# Patient Record
Sex: FEMALE | Race: WHITE | NOT HISPANIC OR LATINO | Employment: OTHER | URBAN - METROPOLITAN AREA
[De-identification: names, ages, dates, MRNs, and addresses within clinical notes are randomized per-mention and may not be internally consistent; named-entity substitution may affect disease eponyms.]

---

## 2017-01-16 ENCOUNTER — PATIENT MESSAGE (OUTPATIENT)
Dept: ENDOCRINOLOGY | Facility: CLINIC | Age: 57
End: 2017-01-16

## 2017-06-20 DIAGNOSIS — D49.59 OVARIAN TUMOR: Primary | ICD-10-CM

## 2017-08-22 ENCOUNTER — PATIENT MESSAGE (OUTPATIENT)
Dept: ENDOCRINOLOGY | Facility: CLINIC | Age: 57
End: 2017-08-22

## 2017-08-22 DIAGNOSIS — M25.569 KNEE PAIN, UNSPECIFIED CHRONICITY, UNSPECIFIED LATERALITY: Primary | ICD-10-CM

## 2017-08-23 ENCOUNTER — PATIENT MESSAGE (OUTPATIENT)
Dept: ENDOCRINOLOGY | Facility: CLINIC | Age: 57
End: 2017-08-23

## 2017-08-23 ENCOUNTER — PATIENT MESSAGE (OUTPATIENT)
Dept: ORTHOPEDICS | Facility: CLINIC | Age: 57
End: 2017-08-23

## 2017-10-09 ENCOUNTER — HOSPITAL ENCOUNTER (OUTPATIENT)
Dept: RADIOLOGY | Facility: HOSPITAL | Age: 57
Discharge: HOME | End: 2017-10-09
Attending: ORTHOPAEDIC SURGERY
Payer: COMMERCIAL

## 2017-10-09 ENCOUNTER — HOSPITAL ENCOUNTER (OUTPATIENT)
Dept: CARDIOLOGY | Facility: CLINIC | Age: 57
Discharge: HOME | End: 2017-10-09
Attending: ORTHOPAEDIC SURGERY
Payer: COMMERCIAL

## 2017-10-09 ENCOUNTER — OFFICE VISIT (OUTPATIENT)
Dept: ORTHOPEDICS | Facility: CLINIC | Age: 57
End: 2017-10-09
Payer: COMMERCIAL

## 2017-10-09 ENCOUNTER — OFFICE VISIT (OUTPATIENT)
Dept: ENDOCRINOLOGY | Facility: CLINIC | Age: 57
End: 2017-10-09
Payer: COMMERCIAL

## 2017-10-09 VITALS — WEIGHT: 157.94 LBS | HEIGHT: 72 IN | BODY MASS INDEX: 21.39 KG/M2 | TEMPERATURE: 99 F

## 2017-10-09 VITALS
HEART RATE: 64 BPM | BODY MASS INDEX: 21.35 KG/M2 | WEIGHT: 157.63 LBS | DIASTOLIC BLOOD PRESSURE: 78 MMHG | SYSTOLIC BLOOD PRESSURE: 124 MMHG | HEIGHT: 72 IN

## 2017-10-09 DIAGNOSIS — D27.0: Primary | ICD-10-CM

## 2017-10-09 DIAGNOSIS — E11.9 TYPE 2 DIABETES MELLITUS WITHOUT COMPLICATION, WITHOUT LONG-TERM CURRENT USE OF INSULIN: ICD-10-CM

## 2017-10-09 DIAGNOSIS — M79.89 SOFT TISSUE MASS: Primary | ICD-10-CM

## 2017-10-09 DIAGNOSIS — M79.89 SOFT TISSUE MASS: ICD-10-CM

## 2017-10-09 DIAGNOSIS — E55.9 VITAMIN D DEFICIENCY DISEASE: ICD-10-CM

## 2017-10-09 PROCEDURE — 71020 XR CHEST PA AND LATERAL PRE-OP: CPT | Mod: TC

## 2017-10-09 PROCEDURE — 93000 ELECTROCARDIOGRAM COMPLETE: CPT | Mod: S$GLB,,, | Performed by: INTERNAL MEDICINE

## 2017-10-09 PROCEDURE — 99999 PR PBB SHADOW E&M-EST. PATIENT-LVL III: CPT | Mod: PBBFAC,,, | Performed by: INTERNAL MEDICINE

## 2017-10-09 PROCEDURE — 99213 OFFICE O/P EST LOW 20 MIN: CPT | Mod: S$GLB,,, | Performed by: INTERNAL MEDICINE

## 2017-10-09 PROCEDURE — 71020 XR CHEST PA AND LATERAL PRE-OP: CPT | Mod: 26,,, | Performed by: RADIOLOGY

## 2017-10-09 PROCEDURE — 99204 OFFICE O/P NEW MOD 45 MIN: CPT | Mod: 57,S$GLB,, | Performed by: ORTHOPAEDIC SURGERY

## 2017-10-09 PROCEDURE — 99999 PR PBB SHADOW E&M-EST. PATIENT-LVL III: CPT | Mod: PBBFAC,,, | Performed by: ORTHOPAEDIC SURGERY

## 2017-10-09 NOTE — PROGRESS NOTES
CHIEF COMPLAINT:  right thigh soft-tissue mass.                                                                                                                               HISTORY OF PRESENT ILLNESS:  The patient is a 57 y.o. female  who presents  for evaluation of her right thigh mass. She has had a right knee mass present for several years.  It has had slow growth.    Pain:negative    Night pain: Negative    Growth: Yes    History of skin warmth/erythema/changes: No    She  presents for further treatment recommendations.   She denies cough, sputum production, shortness of breath,   fever, chills, night sweats or weight loss.  She denies distal paresthesias.  She has no discreet history of prior trauma in the area.                                                                                                         PAST MEDICAL HISTORY:    Past Medical History:   Diagnosis Date    Anxiety     Cancer     leydig cell tumor    Elevated testosterone level in female     GE reflux     Hirsutism     HSV (herpes simplex virus) anogenital infection     Hyperlipidemia     PE (pulmonary embolism)     bilateral    PONV (postoperative nausea and vomiting)     Type II or unspecified type diabetes mellitus without mention of complication, not stated as uncontrolled    .                                               PAST SURGICAL HISTORY:    Past Surgical History:   Procedure Laterality Date    breast augmentatin      broken jaw      DILATION AND CURETTAGE OF UTERUS      HEMORRHOID SURGERY      HERNIA REPAIR      LYMPHADENECTOMY      OMENTECTOMY  9/14    TX REMOVAL OF OVARY/TUBE(S)      TONSILLECTOMY      TUBAL LIGATION           Current Outpatient Prescriptions:     alprazolam (XANAX) 0.25 MG tablet, Take 0.25 mg by mouth once daily. 1/2 tablet every am, Disp: , Rfl:     atenolol (TENORMIN) 50 MG tablet, Take 50 mg by mouth once daily., Disp: , Rfl:     atorvastatin (LIPITOR) 20 MG tablet, , Disp: ,  Rfl:     clotrimazole-betamethasone 1-0.05% (LOTRISONE) cream, , Disp: , Rfl:     digoxin (LANOXIN) 250 mcg tablet, Take 250 mcg by mouth once daily. , Disp: , Rfl:     escitalopram oxalate (LEXAPRO) 10 MG tablet, Take 5 mg by mouth once daily., Disp: , Rfl:     furosemide (LASIX) 40 MG tablet, , Disp: , Rfl:     glimepiride (AMARYL) 2 MG tablet, Take 2 mg by mouth daily with breakfast. , Disp: , Rfl:     metformin (GLUCOPHAGE) 1000 MG tablet, Take 1,000 mg by mouth 2 (two) times daily with meals., Disp: , Rfl:     omeprazole (PRILOSEC) 40 MG capsule, Take 40 mg by mouth every morning. , Disp: , Rfl:     potassium chloride (KLOR-CON) 10 MEQ TbSR, , Disp: , Rfl:     valacyclovir (VALTREX) 500 MG tablet, Take 500 mg by mouth once daily., Disp: , Rfl:     SOCIAL HISTORY:  Reviewed per EPIC history for tobacco or alcohol use and she  is an active  57 y.o.  female                                                                             FAMILY MEDICAL HISTORY:  family history includes Bipolar disorder in her brother and mother; Breast cancer in her cousin; Breast cancer (age of onset: 60) in her maternal aunt; Diabetes in her brother and father; Heart disease in her father; Heart failure in her mother; Hypertension in her sister; Kidney disease in her mother; Pancreatic cancer in her paternal grandmother.                                                                                                                               REVIEW OF SYSTEMS:  Full 10-system review of systems was performed today.    She denies joint arthralgias or back pain.   Denies chest pain, palpitations, shortness of breath.   Denies excessive thirst, urination or heat or cold intolerance.   Denies nausea, vomiting, melena or hematochezia.    Denies fever, chills, night sweats, weight loss.    Denies dysuria or hematuria.   Denies history of anxiety or depression.    Denies change in vision or hearing.    Denies any skin  abnormalities or rash.   Denies upper or lower extremity paresthesias or lightheadedness.    Denies cough, shortness of breath or hemoptysis.                                                                                                                                     PHYSICAL EXAMINATION:                                                        GENERAL:  A well-developed, well-nourished 57 y.o. female who is alert and       oriented in no acute distress.      Gait: She  walks with a normal gait.                   EXTREMITIES:  Examination of the extremities reveals there is a visible mass of the right knee lateral>medial.    The skin over both lower extremities is normal and unremarkable.  She has a   painless range of motion of the hips, knees and ankles            bilaterally.  Sensation is intact in both lower extremities.  There are no motor deficits in the lower  extremities bilaterally.  Pedal pulses are palpable distally bilaterally.    She has no calf tenderness to palpation nor edema.  She has a palpable mass ov the right distal thigh/knee.    HEENT: normocephalic and atraumatic, EOMI, sclera white.  Heart: RRR without appreciable mumrur  Lungs: CTA  Abdomen: soft, non-tender    The MRI was personally reviewed with the patient.  The lesion on all imaging sequences follows that of the subcutaneous fat with no significant areas of heterogeneity.  By MRI criteria this is highly suggestive of a benign lipoma.  There is no significant radiographic suggestion that this is liposarcoma.    Impression:  Lipoma    Plan:  Treatment options were discussed which include simple clinical observation vs. surgical excision.  I do not believe that open or needle biopsy is warranted.    The patient wishes to proceed with surgery at this time.    Risks and complications were discussed including but not limited to the risks of anesthetic complications, infection, wound healing complications, pain, stiffness, DVT, pulmonary  embolism, perioperative medical risks (cardiac, pulmonary, renal, neurologic), and death among others were discussed, including the risk of local recurrence.  The patient elects to proceed.  Will give lovenox postop with her history of PE.

## 2017-10-09 NOTE — H&P
CHIEF COMPLAINT:  right thigh soft-tissue mass.                                                                                                                               HISTORY OF PRESENT ILLNESS:  The patient is a 57 y.o. female  who presents  for evaluation of her right thigh mass. She has had a right knee mass present for several years.  It has had slow growth.    Pain:negative    Night pain: Negative    Growth: Yes    History of skin warmth/erythema/changes: No    She  presents for further treatment recommendations.   She denies cough, sputum production, shortness of breath,   fever, chills, night sweats or weight loss.  She denies distal paresthesias.  She has no discreet history of prior trauma in the area.                                                                                                         PAST MEDICAL HISTORY:    Past Medical History:   Diagnosis Date    Anxiety     Cancer     leydig cell tumor    Elevated testosterone level in female     GE reflux     Hirsutism     HSV (herpes simplex virus) anogenital infection     Hyperlipidemia     PE (pulmonary embolism)     bilateral    PONV (postoperative nausea and vomiting)     Type II or unspecified type diabetes mellitus without mention of complication, not stated as uncontrolled    .                                               PAST SURGICAL HISTORY:    Past Surgical History:   Procedure Laterality Date    breast augmentatin      broken jaw      DILATION AND CURETTAGE OF UTERUS      HEMORRHOID SURGERY      HERNIA REPAIR      LYMPHADENECTOMY      OMENTECTOMY  9/14    [place] REMOVAL OF OVARY/TUBE(S)      TONSILLECTOMY      TUBAL LIGATION           Current Outpatient Prescriptions:     alprazolam (XANAX) 0.25 MG tablet, Take 0.25 mg by mouth once daily. 1/2 tablet every am, Disp: , Rfl:     atenolol (TENORMIN) 50 MG tablet, Take 50 mg by mouth once daily., Disp: , Rfl:     atorvastatin (LIPITOR) 20 MG tablet, , Disp: ,  Rfl:     clotrimazole-betamethasone 1-0.05% (LOTRISONE) cream, , Disp: , Rfl:     digoxin (LANOXIN) 250 mcg tablet, Take 250 mcg by mouth once daily. , Disp: , Rfl:     escitalopram oxalate (LEXAPRO) 10 MG tablet, Take 5 mg by mouth once daily., Disp: , Rfl:     furosemide (LASIX) 40 MG tablet, , Disp: , Rfl:     glimepiride (AMARYL) 2 MG tablet, Take 2 mg by mouth daily with breakfast. , Disp: , Rfl:     metformin (GLUCOPHAGE) 1000 MG tablet, Take 1,000 mg by mouth 2 (two) times daily with meals., Disp: , Rfl:     omeprazole (PRILOSEC) 40 MG capsule, Take 40 mg by mouth every morning. , Disp: , Rfl:     potassium chloride (KLOR-CON) 10 MEQ TbSR, , Disp: , Rfl:     valacyclovir (VALTREX) 500 MG tablet, Take 500 mg by mouth once daily., Disp: , Rfl:     SOCIAL HISTORY:  Reviewed per EPIC history for tobacco or alcohol use and she  is an active  57 y.o.  female                                                                             FAMILY MEDICAL HISTORY:  family history includes Bipolar disorder in her brother and mother; Breast cancer in her cousin; Breast cancer (age of onset: 60) in her maternal aunt; Diabetes in her brother and father; Heart disease in her father; Heart failure in her mother; Hypertension in her sister; Kidney disease in her mother; Pancreatic cancer in her paternal grandmother.                                                                                                                               REVIEW OF SYSTEMS:  Full 10-system review of systems was performed today.    She denies joint arthralgias or back pain.   Denies chest pain, palpitations, shortness of breath.   Denies excessive thirst, urination or heat or cold intolerance.   Denies nausea, vomiting, melena or hematochezia.    Denies fever, chills, night sweats, weight loss.    Denies dysuria or hematuria.   Denies history of anxiety or depression.    Denies change in vision or hearing.    Denies any skin  abnormalities or rash.   Denies upper or lower extremity paresthesias or lightheadedness.    Denies cough, shortness of breath or hemoptysis.                                                                                                                                     PHYSICAL EXAMINATION:                                                        GENERAL:  A well-developed, well-nourished 57 y.o. female who is alert and       oriented in no acute distress.      Gait: She  walks with a normal gait.                   EXTREMITIES:  Examination of the extremities reveals there is a visible mass of the right knee lateral>medial.    The skin over both lower extremities is normal and unremarkable.  She has a   painless range of motion of the hips, knees and ankles            bilaterally.  Sensation is intact in both lower extremities.  There are no motor deficits in the lower  extremities bilaterally.  Pedal pulses are palpable distally bilaterally.    She has no calf tenderness to palpation nor edema.  She has a palpable mass ov the right distal thigh/knee.    HEENT: normocephalic and atraumatic, EOMI, sclera white.  Heart: RRR without appreciable mumrur  Lungs: CTA  Abdomen: soft, non-tender    The MRI was personally reviewed with the patient.  The lesion on all imaging sequences follows that of the subcutaneous fat with no significant areas of heterogeneity.  By MRI criteria this is highly suggestive of a benign lipoma.  There is no significant radiographic suggestion that this is liposarcoma.    Impression:  Lipoma    Plan:  Treatment options were discussed which include simple clinical observation vs. surgical excision.  I do not believe that open or needle biopsy is warranted.    The patient wishes to proceed with surgery at this time.    Risks and complications were discussed including but not limited to the risks of anesthetic complications, infection, wound healing complications, pain, stiffness, DVT, pulmonary  embolism, perioperative medical risks (cardiac, pulmonary, renal, neurologic), and death among others were discussed, including the risk of local recurrence.  The patient elects to proceed.  Will give lovenox postop with her history of PE.

## 2017-10-09 NOTE — PROGRESS NOTES
Subjective:      Patient ID: Carmen Burns is a 57 y.o. female.    Chief Complaint: ovarian leydig cell tumor     is presenting for follow up of ovarian tumor.    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 She had a post op bilateral pulmonary emboli and had to have a chest tube placed because of pleural effusion.   off off coumadin      Last seen by Dr. Smith in May 2016  does labs at [facility] - reports testosterone level was 18 from last week.    Continues to have hirsutism. Not any worse than last visit. Plucks hair above lip and below chin     No Hot flashes    + vaginal dryness   No libido       C/o fatigue  + snores      Weight stable    Does gardening. No formal exercise     + smoking        on metformin and SEGUNDO. Reports a1c of 7.3 from September 2017     Previously did clerical work but retired in 2016    Does not take vitamin d       Review of Systems   Constitutional: Negative for unexpected weight change.   Eyes: Negative for visual disturbance.   Respiratory: Negative for shortness of breath.    Cardiovascular: Negative for chest pain.   Gastrointestinal: Negative for abdominal pain.   Musculoskeletal: Positive for arthralgias.   Skin: Negative for wound.        +hirsutism   Neurological: Negative for headaches.   Hematological: Does not bruise/bleed easily.   Psychiatric/Behavioral: Negative for sleep disturbance.       Objective:     /78 (BP Location: Right arm, Patient Position: Sitting, BP Method: Large (Manual))   Pulse 64   Ht 6' (1.829 m)   Wt 71.5 kg (157 lb 10.1 oz)   BMI 21.38 kg/m²      Physical Exam   Constitutional: She appears well-developed and well-nourished.   HENT:   Head: Normocephalic and atraumatic.   Neck: Normal range of motion. No thyromegaly present.   Cardiovascular: Normal rate and regular rhythm.    Skin: Skin is warm and dry.   Vitals reviewed.      Assessment:     1. 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 Type 2 diabetes mellitus without complication, without long-term current use of insulin    3. Vitamin D deficiency disease        Plan:     1.  Leydig cell s/p RALH/BSO 9/8/14. Reports testosterone level of 18 last week. Will request records from [place]. Recommend yearly testosterone.... Sooner if any s/s androgen excess   2. Continue metformin and glimepiride. Dm type 2 managed by pcp. Would have pcp consider adding januvia as her last A1C from 9/2017 was at 7.3. Informed her that she does not need to regularly check her blood glucose.  3. Recommend vit d 2000 IU daily    Follow-up prn    Discussed with Dr. Luis Fitzgerald MD     I, Vandana Smith MD,  have personally taken the history and examined the patient and agree with the resident's note as stated above.

## 2017-10-11 ENCOUNTER — PATIENT MESSAGE (OUTPATIENT)
Dept: SURGERY | Facility: HOSPITAL | Age: 57
End: 2017-10-11

## 2017-10-11 ENCOUNTER — PATIENT MESSAGE (OUTPATIENT)
Dept: ENDOCRINOLOGY | Facility: CLINIC | Age: 57
End: 2017-10-11

## 2017-11-02 ENCOUNTER — PATIENT MESSAGE (OUTPATIENT)
Dept: SURGERY | Facility: HOSPITAL | Age: 57
End: 2017-11-02

## 2017-11-02 DIAGNOSIS — Z98.890 POSTOPERATIVE STATE: Primary | ICD-10-CM

## 2017-11-02 RX ORDER — ENOXAPARIN SODIUM 100 MG/ML
40 INJECTION SUBCUTANEOUS DAILY
Qty: 10 SYRINGE | Refills: 1 | Status: SHIPPED | OUTPATIENT
Start: 2017-11-02

## 2017-11-05 ENCOUNTER — PATIENT MESSAGE (OUTPATIENT)
Dept: ENDOCRINOLOGY | Facility: CLINIC | Age: 57
End: 2017-11-05

## 2017-11-08 ENCOUNTER — ANESTHESIA EVENT (OUTPATIENT)
Dept: SURGERY | Facility: HOSPITAL | Age: 57
End: 2017-11-08
Payer: COMMERCIAL

## 2017-11-09 NOTE — PRE-PROCEDURE INSTRUCTIONS
PreOp Instructions given:     - Verbal medication information (what to hold and what to take)   - NPO guidelines   - Arrival place directions given;  - Bathing with antibacterial soap   - Don't wear any jewelry or bring any valuables AM of surgery   - No makeup or moisturizer to face   - No perfume/cologne, powder, lotions or aftershave     Pt. verbalized understanding.     Personal history of PONV denies any family h/o complicatons with anesthesia or sedation.

## 2017-11-10 ENCOUNTER — ANESTHESIA (OUTPATIENT)
Dept: SURGERY | Facility: HOSPITAL | Age: 57
End: 2017-11-10
Payer: COMMERCIAL

## 2017-11-10 ENCOUNTER — SURGERY (OUTPATIENT)
Age: 57
End: 2017-11-10

## 2017-11-10 ENCOUNTER — HOSPITAL ENCOUNTER (OUTPATIENT)
Facility: HOSPITAL | Age: 57
Discharge: HOME | End: 2017-11-10
Attending: ORTHOPAEDIC SURGERY | Admitting: ORTHOPAEDIC SURGERY
Payer: COMMERCIAL

## 2017-11-10 VITALS
SYSTOLIC BLOOD PRESSURE: 97 MMHG | BODY MASS INDEX: 20.99 KG/M2 | RESPIRATION RATE: 16 BRPM | TEMPERATURE: 97 F | WEIGHT: 155 LBS | OXYGEN SATURATION: 99 % | DIASTOLIC BLOOD PRESSURE: 59 MMHG | HEIGHT: 72 IN | HEART RATE: 56 BPM

## 2017-11-10 LAB
POCT GLUCOSE: 106 MG/DL (ref 70–110)
POCT GLUCOSE: 153 MG/DL (ref 70–110)

## 2017-11-10 PROCEDURE — 71000015 HC POSTOP RECOV 1ST HR: Performed by: ORTHOPAEDIC SURGERY

## 2017-11-10 PROCEDURE — 25000003 PHARM REV CODE 250: Performed by: NURSE ANESTHETIST, CERTIFIED REGISTERED

## 2017-11-10 PROCEDURE — 63600175 PHARM REV CODE 636 W HCPCS: Performed by: STUDENT IN AN ORGANIZED HEALTH CARE EDUCATION/TRAINING PROGRAM

## 2017-11-10 PROCEDURE — 36000707: Performed by: ORTHOPAEDIC SURGERY

## 2017-11-10 PROCEDURE — 63600175 PHARM REV CODE 636 W HCPCS: Performed by: ANESTHESIOLOGY

## 2017-11-10 PROCEDURE — 82962 GLUCOSE BLOOD TEST: CPT | Performed by: ORTHOPAEDIC SURGERY

## 2017-11-10 PROCEDURE — 37000009 HC ANESTHESIA EA ADD 15 MINS: Performed by: ORTHOPAEDIC SURGERY

## 2017-11-10 PROCEDURE — 36000706: Performed by: ORTHOPAEDIC SURGERY

## 2017-11-10 PROCEDURE — 71000039 HC RECOVERY, EACH ADD'L HOUR: Performed by: ORTHOPAEDIC SURGERY

## 2017-11-10 PROCEDURE — 27000221 HC OXYGEN, UP TO 24 HOURS

## 2017-11-10 PROCEDURE — 64447 NJX AA&/STRD FEMORAL NRV IMG: CPT | Mod: 59,RT,, | Performed by: ANESTHESIOLOGY

## 2017-11-10 PROCEDURE — 63600175 PHARM REV CODE 636 W HCPCS: Performed by: NURSE ANESTHETIST, CERTIFIED REGISTERED

## 2017-11-10 PROCEDURE — D9220A PRA ANESTHESIA: Mod: CRNA,,, | Performed by: NURSE ANESTHETIST, CERTIFIED REGISTERED

## 2017-11-10 PROCEDURE — 25000003 PHARM REV CODE 250: Performed by: ANESTHESIOLOGY

## 2017-11-10 PROCEDURE — D9220A PRA ANESTHESIA: Mod: ANES,,, | Performed by: ANESTHESIOLOGY

## 2017-11-10 PROCEDURE — 25000003 PHARM REV CODE 250: Performed by: STUDENT IN AN ORGANIZED HEALTH CARE EDUCATION/TRAINING PROGRAM

## 2017-11-10 PROCEDURE — 88307 TISSUE EXAM BY PATHOLOGIST: CPT | Performed by: PATHOLOGY

## 2017-11-10 PROCEDURE — 27339 EXC THIGH/KNEE TUM DEP 5CM/>: CPT | Mod: RT,,, | Performed by: ORTHOPAEDIC SURGERY

## 2017-11-10 PROCEDURE — 71000033 HC RECOVERY, INTIAL HOUR: Performed by: ORTHOPAEDIC SURGERY

## 2017-11-10 PROCEDURE — 76942 ECHO GUIDE FOR BIOPSY: CPT | Performed by: ANESTHESIOLOGY

## 2017-11-10 PROCEDURE — 88307 TISSUE EXAM BY PATHOLOGIST: CPT | Mod: 26,,, | Performed by: PATHOLOGY

## 2017-11-10 PROCEDURE — 63600175 PHARM REV CODE 636 W HCPCS

## 2017-11-10 PROCEDURE — 71000016 HC POSTOP RECOV ADDL HR: Performed by: ORTHOPAEDIC SURGERY

## 2017-11-10 PROCEDURE — 37000008 HC ANESTHESIA 1ST 15 MINUTES: Performed by: ORTHOPAEDIC SURGERY

## 2017-11-10 RX ORDER — DEXAMETHASONE SODIUM PHOSPHATE 4 MG/ML
INJECTION, SOLUTION INTRA-ARTICULAR; INTRALESIONAL; INTRAMUSCULAR; INTRAVENOUS; SOFT TISSUE
Status: DISCONTINUED | OUTPATIENT
Start: 2017-11-10 | End: 2017-11-10

## 2017-11-10 RX ORDER — SODIUM CHLORIDE 9 MG/ML
INJECTION, SOLUTION INTRAVENOUS CONTINUOUS PRN
Status: DISCONTINUED | OUTPATIENT
Start: 2017-11-10 | End: 2017-11-10

## 2017-11-10 RX ORDER — OXYCODONE HYDROCHLORIDE 5 MG/1
10 TABLET ORAL EVERY 4 HOURS PRN
Status: DISCONTINUED | OUTPATIENT
Start: 2017-11-10 | End: 2017-11-10 | Stop reason: HOSPADM

## 2017-11-10 RX ORDER — FENTANYL CITRATE 50 UG/ML
INJECTION, SOLUTION INTRAMUSCULAR; INTRAVENOUS
Status: DISCONTINUED | OUTPATIENT
Start: 2017-11-10 | End: 2017-11-10

## 2017-11-10 RX ORDER — PROPOFOL 10 MG/ML
VIAL (ML) INTRAVENOUS
Status: DISCONTINUED | OUTPATIENT
Start: 2017-11-10 | End: 2017-11-10

## 2017-11-10 RX ORDER — FENTANYL CITRATE 50 UG/ML
100 INJECTION, SOLUTION INTRAMUSCULAR; INTRAVENOUS EVERY 5 MIN PRN
Status: DISCONTINUED | OUTPATIENT
Start: 2017-11-10 | End: 2017-11-10 | Stop reason: HOSPADM

## 2017-11-10 RX ORDER — ONDANSETRON 2 MG/ML
INJECTION INTRAMUSCULAR; INTRAVENOUS
Status: DISCONTINUED | OUTPATIENT
Start: 2017-11-10 | End: 2017-11-10

## 2017-11-10 RX ORDER — SCOLOPAMINE TRANSDERMAL SYSTEM 1 MG/1
1 PATCH, EXTENDED RELEASE TRANSDERMAL ONCE
Status: COMPLETED | OUTPATIENT
Start: 2017-11-10 | End: 2017-11-10

## 2017-11-10 RX ORDER — OXYCODONE AND ACETAMINOPHEN 10; 325 MG/1; MG/1
1 TABLET ORAL EVERY 4 HOURS PRN
Qty: 60 TABLET | Refills: 0 | Status: SHIPPED | OUTPATIENT
Start: 2017-11-10

## 2017-11-10 RX ORDER — ASPIRIN 81 MG
100 TABLET, DELAYED RELEASE (ENTERIC COATED) ORAL 2 TIMES DAILY
Qty: 56 TABLET | Refills: 0 | Status: SHIPPED | OUTPATIENT
Start: 2017-11-10 | End: 2017-12-08

## 2017-11-10 RX ORDER — MIDAZOLAM HYDROCHLORIDE 1 MG/ML
INJECTION, SOLUTION INTRAMUSCULAR; INTRAVENOUS
Status: DISCONTINUED | OUTPATIENT
Start: 2017-11-10 | End: 2017-11-10

## 2017-11-10 RX ORDER — MUPIROCIN 20 MG/G
OINTMENT TOPICAL
Status: DISCONTINUED | OUTPATIENT
Start: 2017-11-10 | End: 2017-11-10 | Stop reason: HOSPADM

## 2017-11-10 RX ORDER — SODIUM CHLORIDE 0.9 % (FLUSH) 0.9 %
3 SYRINGE (ML) INJECTION
Status: DISCONTINUED | OUTPATIENT
Start: 2017-11-10 | End: 2017-11-10 | Stop reason: HOSPADM

## 2017-11-10 RX ORDER — HYDROMORPHONE HYDROCHLORIDE 1 MG/ML
0.2 INJECTION, SOLUTION INTRAMUSCULAR; INTRAVENOUS; SUBCUTANEOUS EVERY 5 MIN PRN
Status: DISCONTINUED | OUTPATIENT
Start: 2017-11-10 | End: 2017-11-10 | Stop reason: HOSPADM

## 2017-11-10 RX ORDER — OXYCODONE HYDROCHLORIDE 5 MG/1
5 TABLET ORAL
Status: DISCONTINUED | OUTPATIENT
Start: 2017-11-10 | End: 2017-11-10 | Stop reason: HOSPADM

## 2017-11-10 RX ORDER — ONDANSETRON 4 MG/1
4 TABLET, ORALLY DISINTEGRATING ORAL EVERY 8 HOURS PRN
Qty: 30 TABLET | Refills: 0 | Status: SHIPPED | OUTPATIENT
Start: 2017-11-10

## 2017-11-10 RX ORDER — LIDOCAINE HCL/PF 100 MG/5ML
SYRINGE (ML) INTRAVENOUS
Status: DISCONTINUED | OUTPATIENT
Start: 2017-11-10 | End: 2017-11-10

## 2017-11-10 RX ORDER — MIDAZOLAM HYDROCHLORIDE 1 MG/ML
2 INJECTION INTRAMUSCULAR; INTRAVENOUS EVERY 5 MIN PRN
Status: DISCONTINUED | OUTPATIENT
Start: 2017-11-10 | End: 2017-11-10 | Stop reason: HOSPADM

## 2017-11-10 RX ORDER — HYDROMORPHONE HYDROCHLORIDE 1 MG/ML
INJECTION, SOLUTION INTRAMUSCULAR; INTRAVENOUS; SUBCUTANEOUS
Status: COMPLETED
Start: 2017-11-10 | End: 2017-11-10

## 2017-11-10 RX ORDER — FENTANYL CITRATE 50 UG/ML
25 INJECTION, SOLUTION INTRAMUSCULAR; INTRAVENOUS EVERY 5 MIN PRN
Status: COMPLETED | OUTPATIENT
Start: 2017-11-10 | End: 2017-11-10

## 2017-11-10 RX ADMIN — DEXAMETHASONE SODIUM PHOSPHATE 4 MG: 4 INJECTION, SOLUTION INTRAMUSCULAR; INTRAVENOUS at 01:11

## 2017-11-10 RX ADMIN — SCOPALAMINE 1 PATCH: 1 PATCH, EXTENDED RELEASE TRANSDERMAL at 10:11

## 2017-11-10 RX ADMIN — OXYCODONE HYDROCHLORIDE 5 MG: 5 TABLET ORAL at 02:11

## 2017-11-10 RX ADMIN — HYDROMORPHONE HYDROCHLORIDE 0.2 MG: 1 INJECTION, SOLUTION INTRAMUSCULAR; INTRAVENOUS; SUBCUTANEOUS at 04:11

## 2017-11-10 RX ADMIN — MIDAZOLAM HYDROCHLORIDE 2 MG: 1 INJECTION, SOLUTION INTRAMUSCULAR; INTRAVENOUS at 10:11

## 2017-11-10 RX ADMIN — FENTANYL CITRATE 25 MCG: 50 INJECTION, SOLUTION INTRAMUSCULAR; INTRAVENOUS at 01:11

## 2017-11-10 RX ADMIN — MUPIROCIN: 20 OINTMENT TOPICAL at 10:11

## 2017-11-10 RX ADMIN — FENTANYL CITRATE 50 MCG: 50 INJECTION, SOLUTION INTRAMUSCULAR; INTRAVENOUS at 01:11

## 2017-11-10 RX ADMIN — FENTANYL CITRATE 25 MCG: 50 INJECTION, SOLUTION INTRAMUSCULAR; INTRAVENOUS at 02:11

## 2017-11-10 RX ADMIN — PROPOFOL 50 MG: 10 INJECTION, EMULSION INTRAVENOUS at 01:11

## 2017-11-10 RX ADMIN — LIDOCAINE HYDROCHLORIDE 75 MG: 20 INJECTION, SOLUTION INTRAVENOUS at 12:11

## 2017-11-10 RX ADMIN — PROPOFOL 200 MG: 10 INJECTION, EMULSION INTRAVENOUS at 12:11

## 2017-11-10 RX ADMIN — SODIUM CHLORIDE, SODIUM GLUCONATE, SODIUM ACETATE, POTASSIUM CHLORIDE, MAGNESIUM CHLORIDE, SODIUM PHOSPHATE, DIBASIC, AND POTASSIUM PHOSPHATE: .53; .5; .37; .037; .03; .012; .00082 INJECTION, SOLUTION INTRAVENOUS at 01:11

## 2017-11-10 RX ADMIN — FENTANYL CITRATE 25 MCG: 50 INJECTION, SOLUTION INTRAMUSCULAR; INTRAVENOUS at 03:11

## 2017-11-10 RX ADMIN — SODIUM CHLORIDE: 0.9 INJECTION, SOLUTION INTRAVENOUS at 12:11

## 2017-11-10 RX ADMIN — MIDAZOLAM HYDROCHLORIDE 2 MG: 1 INJECTION, SOLUTION INTRAMUSCULAR; INTRAVENOUS at 12:11

## 2017-11-10 RX ADMIN — OXYCODONE HYDROCHLORIDE 5 MG: 5 TABLET ORAL at 06:11

## 2017-11-10 RX ADMIN — ONDANSETRON 4 MG: 2 INJECTION INTRAMUSCULAR; INTRAVENOUS at 01:11

## 2017-11-10 RX ADMIN — HYDROMORPHONE HYDROCHLORIDE 0.2 MG: 1 INJECTION, SOLUTION INTRAMUSCULAR; INTRAVENOUS; SUBCUTANEOUS at 03:11

## 2017-11-10 NOTE — ANESTHESIA PREPROCEDURE EVALUATION
11/10/2017  Carmen Burns is a 57 y.o., female.    Pre-operative evaluation for Procedure(s) (LRB):  [id number] (Right)    Carmen Burns is a 57 y.o. 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      Current Outpatient Prescriptions on File Prior to Encounter   Medication Sig Dispense Refill    alprazolam (XANAX) 0.25 MG tablet Take 0.25 mg by mouth once daily. 1/2 tablet every am      atenolol (TENORMIN) 50 MG tablet Take 50 mg by mouth once daily.      atorvastatin (LIPITOR) 20 MG tablet Take 20 mg by mouth once daily.       digoxin (LANOXIN) 250 mcg tablet Take 250 mcg by mouth once daily.       escitalopram oxalate (LEXAPRO) 10 MG tablet Take 5 mg by mouth once daily.      furosemide (LASIX) 40 MG tablet Take 40 mg by mouth once daily.       glimepiride (AMARYL) 2 MG tablet Take 2 mg by mouth daily with breakfast.       metformin (GLUCOPHAGE) 1000 MG tablet Take 1,000 mg by mouth 2 (two) times daily with meals.      omeprazole (PRILOSEC) 40 MG capsule Take 40 mg by mouth every morning.       potassium chloride (KLOR-CON) 10 MEQ TbSR Take 10 mEq by mouth once daily.       valacyclovir (VALTREX) 500 MG tablet Take 500 mg by mouth once daily.         Past Surgical History:   Procedure Laterality Date    breast augmentatin      broken jaw      DILATION AND CURETTAGE OF UTERUS      HEMORRHOID SURGERY      HERNIA REPAIR       LYMPHADENECTOMY      OMENTECTOMY  9/14    [place] REMOVAL OF OVARY/TUBE(S)      TONSILLECTOMY      TUBAL LIGATION         Social History     Social History    Marital status:      Spouse name: N/A    Number of children: N/A    Years of education: N/A     Occupational History    Not on file.     Social History Main Topics    Smoking status: Current Every Day Smoker     Packs/day: 1.00     Years: 38.00     Types: Cigarettes    Smokeless tobacco: Never Used    Alcohol use No    Drug use: No    Sexual activity: Yes     Partners: Male     Other Topics Concern    Not on file     Social History Narrative    No narrative on file         Vital Signs Range (Last 24H):         CBC: No results for input(s): WBC, RBC, HGB, HCT, PLT, MCV, MCH, MCHC in the last 72 hours.    CMP: No results for input(s): NA, K, CL, CO2, BUN, CREATININE, GLU, MG, PHOS, CALCIUM, ALBUMIN, PROT, ALKPHOS, ALT, AST, BILITOT in the last 72 hours.    INR  No results for input(s): INR, PROTIME, APTT in the last 72 hours.    Invalid input(s): PT        Diagnostic Studies:      EKG: 10/9/17  Sinus bradycardia  Otherwise normal ECG    2D Echo:        Anesthesia Evaluation    I have reviewed the Patient Summary Reports.    I have reviewed the Nursing Notes.   I have reviewed the Medications.     Review of Systems  Anesthesia Hx:  History of prior surgery of interest to airway management or planning: Previous anesthesia: General Denies Family Hx of Anesthesia complications.  Personal Hx of Anesthesia complications, Post-Operative Nausea/Vomiting, in the past, but not with recent anesthetics / prophylaxis   Hepatic/GI:   GERD    Endocrine:   Diabetes, type 2        Physical Exam  General:  Well nourished    Airway/Jaw/Neck:  Airway Findings: Mouth Opening: Normal Tongue: Normal  General Airway Assessment: Adult  Mallampati: II  Improves to II with phonation.  TM Distance: Normal, at least 6 cm      Dental:  Dental Findings: In tact     Chest/Lungs:  Chest/Lungs Clear    Heart/Vascular:  Heart Findings: Normal       Mental Status:  Mental Status Findings:  Cooperative, Alert and Oriented         Anesthesia Plan  Type of Anesthesia, risks & benefits discussed:  Anesthesia Type:  general, regional  Patient's Preference:   Intra-op Monitoring Plan: standard ASA monitors  Intra-op Monitoring Plan Comments:   Post Op Pain Control Plan: peripheral nerve block and IV/PO Opioids PRN  Post Op Pain Control Plan Comments:   Induction:   IV  Beta Blocker:  Patient is on a Beta-Blocker and has received one dose within the past 24 hours (No further documentation required).       Informed Consent: Patient understands risks and agrees with Anesthesia plan.  Questions answered. Anesthesia consent signed with patient.  ASA Score: 2     Day of Surgery Review of History & Physical: I have interviewed and examined the patient. I have reviewed the patient's H&P dated: 11/10/17.           Ready For Surgery From Anesthesia Perspective.

## 2017-11-10 NOTE — ANESTHESIA POSTPROCEDURE EVALUATION
Anesthesia Post Evaluation    Patient: Carmen Burns    Procedure(s) Performed: Procedure(s) (LRB):  [id number] (Right)      Patient location during evaluation: PACU  Patient participation: No - Unable to Participate, Sedation  Level of consciousness: awake and alert  Post-procedure vital signs: reviewed and stable  Pain management: adequate  Airway patency: patent  PONV status at discharge: No PONV  Anesthetic complications: no      Cardiovascular status: blood pressure returned to baseline  Respiratory status: unassisted and spontaneous ventilation  Hydration status: euvolemic  Follow-up not needed.        Visit Vitals  BP (!) 123/58   Pulse (!) 56   Temp 36.3 °C (97.3 °F) (Temporal)   Resp 14   Ht 6' (1.829 m)   Wt 70.3 kg (155 lb)   SpO2 95%   Breastfeeding? No   BMI 21.02 kg/m²       Pain/Karolina Score: Pain Assessment Performed: Yes (11/10/2017  1:47 PM)  Presence of Pain: non-verbal indicators absent (11/10/2017  1:47 PM)  Pain Rating Prior to Med Admin: 6 (11/10/2017  2:56 PM)  Karolina Score: 9 (11/10/2017 12:15 PM)

## 2017-11-10 NOTE — BRIEF OP NOTE
[facility]  Brief Operative Note     SUMMARY     Surgery Date: 11/10/2017     Surgeon(s) and Role:     * Bob Bills MD - Primary     * Jamal Aggarwal MD - Resident - Assisting        Pre-op Diagnosis:  Soft tissue mass [M79.9]    Post-op Diagnosis:  Post-Op Diagnosis Codes:     * Soft tissue mass [M79.9]    Procedure(s) (LRB):  [id number] (Right)    Anesthesia: Regional    Description of the findings of the procedure: lipoma removed from anterolateral thigh    Findings/Key Components: above    Estimated Blood Loss: * No values recorded between 11/10/2017  1:07 PM and 11/10/2017  1:46 PM *         Specimens:   Specimen (12h ago through future)    Start     Ordered    11/10/17 1315  Specimen to Pathology - Surgery  Once     Comments:  1) Right thigh mass - perm      11/10/17 1314          Discharge Note    SUMMARY     Admit Date: 11/10/2017    Discharge Date and Time:  11/10/2017 1:47 PM    Hospital Course (synopsis of major diagnoses, care, treatment, and services provided during the course of the hospital stay): Pt admitted for outpatient procedure, tolerated well.  Recovered in PACU and was discharged home on day of surgery.        Final Diagnosis: Post-Op Diagnosis Codes:     * Soft tissue mass [M79.9]    Disposition: Home or Self Care    Follow Up/Patient Instructions: .no f/u needed; will call with results    Medications:  Reconciled Home Medications:   Current Discharge Medication List      START taking these medications    Details   docusate sodium 100 mg capsule Take 100 mg by mouth 2 (two) times daily.  Qty: 56 tablet, Refills: 0      ondansetron (ZOFRAN-ODT) 4 MG TbDL Take 1 tablet (4 mg total) by mouth every 8 (eight) hours as needed.  Qty: 30 tablet, Refills: 0      oxyCODONE-acetaminophen (PERCOCET)  mg per tablet Take 1 tablet by mouth every 4 (four) hours as needed for Pain.  Qty: 60 tablet, Refills: 0         CONTINUE these medications which have NOT  CHANGED    Details   alprazolam (XANAX) 0.25 MG tablet Take 0.25 mg by mouth once daily. 1/2 tablet every am      atenolol (TENORMIN) 50 MG tablet Take 50 mg by mouth once daily.      atorvastatin (LIPITOR) 20 MG tablet Take 20 mg by mouth once daily.       digoxin (LANOXIN) 250 mcg tablet Take 250 mcg by mouth once daily.       escitalopram oxalate (LEXAPRO) 10 MG tablet Take 5 mg by mouth once daily.      furosemide (LASIX) 40 MG tablet Take 40 mg by mouth once daily.       glimepiride (AMARYL) 2 MG tablet Take 2 mg by mouth daily with breakfast.       metformin (GLUCOPHAGE) 1000 MG tablet Take 1,000 mg by mouth 2 (two) times daily with meals.      omeprazole (PRILOSEC) 40 MG capsule Take 40 mg by mouth every morning.       potassium chloride (KLOR-CON) 10 MEQ TbSR Take 10 mEq by mouth once daily.       valacyclovir (VALTREX) 500 MG tablet Take 500 mg by mouth once daily.      enoxaparin (LOVENOX) 40 mg/0.4 mL Syrg Inject 0.4 mLs (40 mg total) into the skin once daily.  Qty: 10 Syringe, Refills: 1    Associated Diagnoses: Postoperative state           No discharge procedures on file.

## 2017-11-10 NOTE — OP NOTE
DATE OF PROCEDURE:  11/10/2017    PREOPERATIVE DIAGNOSIS:  Deep intramuscular lipoma, right distal thigh.    POSTOPERATIVE DIAGNOSIS:  Deep intramuscular lipoma, right distal thigh.    PROCEDURE PERFORMED:  Excision of intramuscular lipoma, right distal thigh (CPT   #67538).    SURGEON:  Bob Bills M.D.    ASSISTANT:  Jamal Aggarwal M.D. (RES).    ANESTHESIA:  General.    ESTIMATED BLOOD LOSS:  Less than 100 mL.    INDICATIONS:  The patient is a 57-year-old female with a longstanding history of   a slowly enlarging mass of the right distal thigh and knee.  This was laterally   based.  Preoperative imaging revealed a large lipomatous mass without   suggestion of liposarcoma.  This was intraarticular and deep to the vastus   lateralis.  Treatment options were discussed and she elected to have this   excised.  Risks and complications were discussed including, but not limited to   the risks of anesthetic complications, infections, wound healing complications,   DVT, pulmonary embolism, death, local recurrence among others and she elected to   proceed.    DESCRIPTION OF PROCEDURE:  The patient was taken to the Operating Room where   general anesthesia was administered via the Anesthesia Department.  The right   lower extremity was sterilely prepped and draped in a normal fashion.    Under tourniquet control, a 12 cm longitudinal incision was made over the   anterolateral aspect of the right distal thigh.  Subcutaneous tissue was   dissected with electrocautery down to the deep fascia, which was incised.  The   vastus lateralis was then split along the line of its fibers.  The mass was then   easily identified deep to this.  It was carefully dissected using blunt   dissection using finger dissection and Metzenbaum scissors.  It dissected easily   from the surrounding subcutaneous tissue.  There were some fibrous adhesions to   the femur, which were easily removed.  The mass was then removed en bloc with   its  pseudocapsule intact and measured 12 x 10 cm.  The wound was thoroughly   irrigated.  The tourniquet was deflated and any significant bleeding was stopped   using electrocautery.  The deep fascia was then closed with interrupted   figure-of-8 sutures of #1 Vicryl.  The subcutaneous tissue was closed with   interrupted inverted stitches of #3-0 Vicryl.  The skin was approximated using a   running subcuticular stitch of #3-0 Monocryl and Dermabond.  A sterile dressing   was applied.  General anesthesia was reversed and she was returned to the   Postanesthesia Care Unit in stable condition.      MSM/IN  dd: 11/10/2017 13:31:22 (CST)  td: 11/10/2017 14:09:32 (CST)  Doc ID   #[id number]  Job ID #[id number]    CC:

## 2017-11-10 NOTE — INTERVAL H&P NOTE
The patient has been examined and the H&P has been reviewed:    I concur with the findings and no changes have occurred since H&P was written.    Anesthesia/Surgery risks, benefits and alternative options discussed and understood by patient/family.          Active Hospital Problems    Diagnosis  POA    Mass [R22.9]  Yes      Resolved Hospital Problems    Diagnosis Date Resolved POA   No resolved problems to display.

## 2017-11-10 NOTE — H&P
CHIEF COMPLAINT:  right thigh soft-tissue mass.                                                                                                                               HISTORY OF PRESENT ILLNESS:  The patient is a 57 y.o. female  who presents  for evaluation of her right thigh mass. She has had a right knee mass present for several years.  It has had slow growth.     Pain:negative     Night pain: Negative     Growth: Yes     History of skin warmth/erythema/changes: No     She  presents for further treatment recommendations.   She denies cough, sputum production, shortness of breath,   fever, chills, night sweats or weight loss.  She denies distal paresthesias.  She has no discreet history of prior trauma in the area.                                                                                                         PAST MEDICAL HISTORY:         Past Medical History:   Diagnosis Date    Anxiety      Cancer       leydig cell tumor    Elevated testosterone level in female      GE reflux      Hirsutism      HSV (herpes simplex virus) anogenital infection      Hyperlipidemia      PE (pulmonary embolism)       bilateral    PONV (postoperative nausea and vomiting)      Type II or unspecified type diabetes mellitus without mention of complication, not stated as uncontrolled     .                                                PAST SURGICAL HISTORY:          Past Surgical History:   Procedure Laterality Date    breast augmentatin        broken jaw        DILATION AND CURETTAGE OF UTERUS        HEMORRHOID SURGERY        HERNIA REPAIR        LYMPHADENECTOMY        OMENTECTOMY   9/14    IL REMOVAL OF OVARY/TUBE(S)        TONSILLECTOMY        TUBAL LIGATION                Current Outpatient Prescriptions:     alprazolam (XANAX) 0.25 MG tablet, Take 0.25 mg by mouth once daily. 1/2 tablet every am, Disp: , Rfl:     atenolol (TENORMIN) 50 MG tablet, Take 50 mg by mouth once daily., Disp: , Rfl:      atorvastatin (LIPITOR) 20 MG tablet, , Disp: , Rfl:     clotrimazole-betamethasone 1-0.05% (LOTRISONE) cream, , Disp: , Rfl:     digoxin (LANOXIN) 250 mcg tablet, Take 250 mcg by mouth once daily. , Disp: , Rfl:     escitalopram oxalate (LEXAPRO) 10 MG tablet, Take 5 mg by mouth once daily., Disp: , Rfl:     furosemide (LASIX) 40 MG tablet, , Disp: , Rfl:     glimepiride (AMARYL) 2 MG tablet, Take 2 mg by mouth daily with breakfast. , Disp: , Rfl:     metformin (GLUCOPHAGE) 1000 MG tablet, Take 1,000 mg by mouth 2 (two) times daily with meals., Disp: , Rfl:     omeprazole (PRILOSEC) 40 MG capsule, Take 40 mg by mouth every morning. , Disp: , Rfl:     potassium chloride (KLOR-CON) 10 MEQ TbSR, , Disp: , Rfl:     valacyclovir (VALTREX) 500 MG tablet, Take 500 mg by mouth once daily., Disp: , Rfl:      SOCIAL HISTORY:  Reviewed per EPIC history for tobacco or alcohol use and she  is an active  57 y.o.  female                                                                             FAMILY MEDICAL HISTORY:  family history includes Bipolar disorder in her brother and mother; Breast cancer in her cousin; Breast cancer (age of onset: 60) in her maternal aunt; Diabetes in her brother and father; Heart disease in her father; Heart failure in her mother; Hypertension in her sister; Kidney disease in her mother; Pancreatic cancer in her paternal grandmother.                                                                                                                               REVIEW OF SYSTEMS:  Full 10-system review of systems was performed today.    She denies joint arthralgias or back pain.   Denies chest pain, palpitations, shortness of breath.   Denies excessive thirst, urination or heat or cold intolerance.   Denies nausea, vomiting, melena or hematochezia.    Denies fever, chills, night sweats, weight loss.    Denies dysuria or hematuria.   Denies history of anxiety or depression.    Denies change in  vision or hearing.    Denies any skin abnormalities or rash.   Denies upper or lower extremity paresthesias or lightheadedness.    Denies cough, shortness of breath or hemoptysis.                                                                                                                                     PHYSICAL EXAMINATION:                                                        GENERAL:  A well-developed, well-nourished 57 y.o. female who is alert and       oriented in no acute distress.       Gait: She  walks with a normal gait.                   EXTREMITIES:  Examination of the extremities reveals there is a visible mass of the right knee lateral>medial.     The skin over both lower extremities is normal and unremarkable.  She has a   painless range of motion of the hips, knees and ankles            bilaterally.  Sensation is intact in both lower extremities.  There are no motor deficits in the lower  extremities bilaterally.  Pedal pulses are palpable distally bilaterally.    She has no calf tenderness to palpation nor edema.  She has a palpable mass ov the right distal thigh/knee.     HEENT: normocephalic and atraumatic, EOMI, sclera white.  Heart: RRR without appreciable mumrur  Lungs: CTA  Abdomen: soft, non-tender     The MRI was personally reviewed with the patient.  The lesion on all imaging sequences follows that of the subcutaneous fat with no significant areas of heterogeneity.  By MRI criteria this is highly suggestive of a benign lipoma.  There is no significant radiographic suggestion that this is liposarcoma.     Impression:  Lipoma     Plan:  Treatment options were discussed which include simple clinical observation vs. surgical excision.  I do not believe that open or needle biopsy is warranted.     The patient wishes to proceed with surgery at this time.    Risks and complications were discussed including but not limited to the risks of anesthetic complications, infection, wound healing  complications, pain, stiffness, DVT, pulmonary embolism, perioperative medical risks (cardiac, pulmonary, renal, neurologic), and death among others were discussed, including the risk of local recurrence.  The patient elects to proceed.  Will give lovenox postop with her history of PE.

## 2017-11-10 NOTE — TRANSFER OF CARE
Anesthesia Transfer of Care Note    Patient: Carmen Burns    Procedure(s) Performed: Procedure(s) (LRB):  [id number] (Right)    Patient location: PACU    Anesthesia Type: general    Transport from OR: Transported from OR on 6-10 L/min O2 by face mask with adequate spontaneous ventilation    Post pain: adequate analgesia    Post assessment: no apparent anesthetic complications and tolerated procedure well    Post vital signs: stable    Level of consciousness: awake, alert and oriented    Nausea/Vomiting: no nausea/vomiting    Complications: none    Transfer of care protocol was followed      Last vitals:   Visit Vitals  /61 (BP Location: Right arm, Patient Position: Lying)   Pulse 60   Temp 36.6 °C (97.9 °F) (Temporal)   Resp 20   Ht 6' (1.829 m)   Wt 70.3 kg (155 lb)   SpO2 100%   Breastfeeding? No   BMI 21.02 kg/m²

## 2017-11-10 NOTE — ANESTHESIA PROCEDURE NOTES
Adductor Canal Single Injection Block    Patient location during procedure: pre-op   Block not for primary anesthetic.  Reason for block: at surgeon's request and post-op pain management   Post-op Pain Location: right knee pain  Start time: 11/10/2017 10:55 AM  Timeout: 11/10/2017 10:55 AM   End time: 11/10/2017 11:05 AM  Staffing  Anesthesiologist: MICHAEL MARY  Resident/CRNA: SUELLEN GO  Performed: resident/CRNA   Preanesthetic Checklist  Completed: patient identified, site marked, surgical consent, pre-op evaluation, timeout performed, IV checked, risks and benefits discussed and monitors and equipment checked  Peripheral Block  Patient position: supine  Prep: ChloraPrep  Patient monitoring: heart rate, cardiac monitor, continuous pulse ox, continuous capnometry and frequent blood pressure checks  Block type: adductor canal  Laterality: right  Injection technique: single shot  Needle  Needle type: Stimuplex   Needle gauge: 21 G  Needle length: 4 in  Needle localization: anatomical landmarks and ultrasound guidance   -ultrasound image captured on disc.  Assessment  Injection assessment: negative aspiration, negative parasthesia and local visualized surrounding nerve  Paresthesia pain: none  Heart rate change: no  Slow fractionated injection: yes  Medications:  Bolus administered: 20 mL of 0.5 ropivacaine  Epinephrine added: 3.75 mcg/mL (1/300,000)  Additional Notes  VSS.  [facility] RN monitoring vitals throughout procedure.  Patient tolerated procedure well.

## 2017-11-11 NOTE — PLAN OF CARE
Problem: Patient Care Overview  Goal: Plan of Care Review  Outcome: Outcome(s) achieved Date Met: 11/10/17  Awake and alert. VSS. Denies  Nausea.Pain is tolerable. Tolerating liquids well.  DC instructions given to patient and family and they verbalize understanding. Knee immobilizer applied to RLE.

## 2018-04-18 NOTE — PLAN OF CARE
Pt sleepy but arousable. Vital signs stable. Denies pain and nausea. Awaiting transfer to phase II for discharge.   none

## 2018-10-10 ENCOUNTER — TELEPHONE (OUTPATIENT)
Dept: ENDOCRINOLOGY | Facility: CLINIC | Age: 58
End: 2018-10-10

## 2018-10-10 NOTE — TELEPHONE ENCOUNTER
----- Message from Margo Carlson sent at 10/10/2018 11:21 AM CDT -----  Contact: Carmen [phone]  PT wants to inform Dr. Smith that her Testerone has increased to 55. She is requesting a call at [phone].

## 2018-10-11 ENCOUNTER — PATIENT MESSAGE (OUTPATIENT)
Dept: ENDOCRINOLOGY | Facility: CLINIC | Age: 58
End: 2018-10-11

## 2018-10-11 DIAGNOSIS — D27.0: Primary | ICD-10-CM

## 2018-10-11 NOTE — TELEPHONE ENCOUNTER
Called patient and asked her to fax us the results.  She also wanted Dr Smith to know that she has had a recent 10lb unexplained weight loss.  Will inform her when I notify her of receiving the lab results.  She will be leaving to go out of town Tuesday Oct 16th.

## 2018-10-12 ENCOUNTER — PATIENT MESSAGE (OUTPATIENT)
Dept: ENDOCRINOLOGY | Facility: CLINIC | Age: 58
End: 2018-10-12

## 2018-10-15 ENCOUNTER — LAB VISIT (OUTPATIENT)
Dept: LAB | Facility: HOSPITAL | Age: 58
End: 2018-10-15
Attending: INTERNAL MEDICINE
Payer: COMMERCIAL

## 2018-10-15 DIAGNOSIS — D27.0: ICD-10-CM

## 2018-10-15 LAB
DHEA-S SERPL-MCNC: 137 UG/DL
ESTRADIOL SERPL-MCNC: 23 PG/ML
FSH SERPL-ACNC: 58.3 MIU/ML
LH SERPL-ACNC: 43.1 MIU/ML
TESTOST SERPL-MCNC: 27 NG/DL

## 2018-10-15 PROCEDURE — 83001 ASSAY OF GONADOTROPIN (FSH): CPT

## 2018-10-15 PROCEDURE — 82670 ASSAY OF TOTAL ESTRADIOL: CPT

## 2018-10-15 PROCEDURE — 82627 DEHYDROEPIANDROSTERONE: CPT

## 2018-10-15 PROCEDURE — 84270 ASSAY OF SEX HORMONE GLOBUL: CPT

## 2018-10-15 PROCEDURE — 84403 ASSAY OF TOTAL TESTOSTERONE: CPT

## 2018-10-15 PROCEDURE — 83002 ASSAY OF GONADOTROPIN (LH): CPT

## 2018-10-19 LAB
ALBUMIN SERPL-MCNC: 4.5 G/DL (ref 3.6–5.1)
SHBG SERPL-SCNC: 32 NMOL/L (ref 14–73)
TESTOST FREE SERPL-MCNC: 2.9 PG/ML (ref 0.2–5)
TESTOST SERPL-MCNC: 25 NG/DL (ref 2–45)
TESTOSTERONE.FREE+WB SERPL-MCNC: 6 NG/DL (ref 0.5–8.5)

## 2022-05-22 ENCOUNTER — HOSPITAL ENCOUNTER (INPATIENT)
Facility: HOSPITAL | Age: 62
LOS: 1 days | Discharge: HOME | DRG: 086 | End: 2022-05-23
Attending: EMERGENCY MEDICINE | Admitting: SURGERY
Payer: COMMERCIAL

## 2022-05-22 DIAGNOSIS — S02.19XA CLOSED FRACTURE OF FRONTAL SINUS, INITIAL ENCOUNTER: ICD-10-CM

## 2022-05-22 DIAGNOSIS — R41.0 CONFUSION: ICD-10-CM

## 2022-05-22 DIAGNOSIS — Z98.890 STATUS POST LUMBAR SPINE OPERATION: ICD-10-CM
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
## 2022-05-22 DIAGNOSIS — S09.90XA INJURY OF HEAD, INITIAL ENCOUNTER: ICD-10-CM
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
## 2022-05-22 DIAGNOSIS — M79.89 LEG SWELLING: ICD-10-CM

## 2022-05-22 DIAGNOSIS — I61.1: ICD-10-CM

## 2022-05-22 DIAGNOSIS — R07.9 CHEST PAIN: ICD-10-CM
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
## 2022-05-22 LAB
ALBUMIN SERPL-MCNC: 3.1 GM/DL (ref 3.5–5)
ALBUMIN/GLOB SERPL: 1.3 RATIO (ref 1.1–2)
ALP SERPL-CCNC: 78 UNIT/L (ref 40–150)
ALT SERPL-CCNC: 14 UNIT/L (ref 0–55)
APTT PPP: 30.4 SECONDS (ref 23.2–33.7)
AST SERPL-CCNC: 20 UNIT/L (ref 5–34)
BASOPHILS # BLD AUTO: 0.04 X10(3)/MCL (ref 0–0.2)
BASOPHILS NFR BLD AUTO: 0.3 %
BILIRUBIN DIRECT+TOT PNL SERPL-MCNC: 0.7 MG/DL
BUN SERPL-MCNC: 9.4 MG/DL (ref 9.8–20.1)
CALCIUM SERPL-MCNC: 8 MG/DL (ref 8.4–10.2)
CHLORIDE SERPL-SCNC: 98 MMOL/L (ref 98–107)
CO2 SERPL-SCNC: 32 MMOL/L (ref 22–29)
CREAT SERPL-MCNC: 0.8 MG/DL (ref 0.55–1.02)
EOSINOPHIL # BLD AUTO: 0.03 X10(3)/MCL (ref 0–0.9)
EOSINOPHIL NFR BLD AUTO: 0.3 %
ERYTHROCYTE [DISTWIDTH] IN BLOOD BY AUTOMATED COUNT: 12.9 % (ref 11.5–17)
ETHANOL SERPL-MCNC: <10 MG/DL
GLOBULIN SER-MCNC: 2.3 GM/DL (ref 2.4–3.5)
GLUCOSE SERPL-MCNC: 76 MG/DL (ref 74–100)
GROUP & RH: NORMAL
HCT VFR BLD AUTO: 34.6 % (ref 37–47)
HGB BLD-MCNC: 11.6 GM/DL (ref 12–16)
IMM GRANULOCYTES # BLD AUTO: 0.05 X10(3)/MCL (ref 0–0.02)
IMM GRANULOCYTES NFR BLD AUTO: 0.4 % (ref 0–0.43)
INDIRECT COOMBS GEL: NORMAL
INR BLD: 1.12 (ref 0–1.3)
LACTATE SERPL-SCNC: 1.1 MMOL/L (ref 0.5–2.2)
LACTATE SERPL-SCNC: 2 MMOL/L (ref 0.5–2.2)
LYMPHOCYTES # BLD AUTO: 2.55 X10(3)/MCL (ref 0.6–4.6)
LYMPHOCYTES NFR BLD AUTO: 21.6 %
MCH RBC QN AUTO: 33.4 PG (ref 27–31)
MCHC RBC AUTO-ENTMCNC: 33.5 MG/DL (ref 33–36)
MCV RBC AUTO: 99.7 FL (ref 80–94)
MONOCYTES # BLD AUTO: 0.93 X10(3)/MCL (ref 0.1–1.3)
MONOCYTES NFR BLD AUTO: 7.9 %
NEUTROPHILS # BLD AUTO: 8.2 X10(3)/MCL (ref 2.1–9.2)
NEUTROPHILS NFR BLD AUTO: 69.5 %
NRBC BLD AUTO-RTO: 0 %
PLATELET # BLD AUTO: 195 X10(3)/MCL (ref 130–400)
PMV BLD AUTO: 9.5 FL (ref 9.4–12.4)
POTASSIUM SERPL-SCNC: 3.1 MMOL/L (ref 3.5–5.1)
PROT SERPL-MCNC: 5.4 GM/DL (ref 6.4–8.3)
PROTHROMBIN TIME: 14.1 SECONDS (ref 12.5–14.5)
RBC # BLD AUTO: 3.47 X10(6)/MCL (ref 4.2–5.4)
SODIUM SERPL-SCNC: 138 MMOL/L (ref 136–145)
WBC # SPEC AUTO: 11.8 X10(3)/MCL (ref 4.5–11.5)

## 2022-05-22 PROCEDURE — 20000000 HC ICU ROOM

## 2022-05-22 PROCEDURE — 96361 HYDRATE IV INFUSION ADD-ON: CPT

## 2022-05-22 PROCEDURE — 63600175 PHARM REV CODE 636 W HCPCS: Performed by: EMERGENCY MEDICINE

## 2022-05-22 PROCEDURE — G0390 TRAUMA RESPONS W/HOSP CRITI: HCPCS

## 2022-05-22 PROCEDURE — 82077 ASSAY SPEC XCP UR&BREATH IA: CPT | Performed by: EMERGENCY MEDICINE

## 2022-05-22 PROCEDURE — 80053 COMPREHEN METABOLIC PANEL: CPT | Performed by: EMERGENCY MEDICINE

## 2022-05-22 PROCEDURE — 85610 PROTHROMBIN TIME: CPT | Performed by: EMERGENCY MEDICINE

## 2022-05-22 PROCEDURE — 63600175 PHARM REV CODE 636 W HCPCS: Performed by: STUDENT IN AN ORGANIZED HEALTH CARE EDUCATION/TRAINING PROGRAM

## 2022-05-22 PROCEDURE — 85730 THROMBOPLASTIN TIME PARTIAL: CPT | Performed by: EMERGENCY MEDICINE

## 2022-05-22 PROCEDURE — 93010 ELECTROCARDIOGRAM REPORT: CPT | Mod: ,,, | Performed by: INTERNAL MEDICINE

## 2022-05-22 PROCEDURE — 85025 COMPLETE CBC W/AUTO DIFF WBC: CPT | Performed by: EMERGENCY MEDICINE

## 2022-05-22 PROCEDURE — 86901 BLOOD TYPING SEROLOGIC RH(D): CPT | Performed by: EMERGENCY MEDICINE

## 2022-05-22 PROCEDURE — 93010 EKG 12-LEAD: ICD-10-PCS | Mod: ,,, | Performed by: INTERNAL MEDICINE

## 2022-05-22 PROCEDURE — 83605 ASSAY OF LACTIC ACID: CPT | Performed by: EMERGENCY MEDICINE

## 2022-05-22 PROCEDURE — 82962 GLUCOSE BLOOD TEST: CPT

## 2022-05-22 PROCEDURE — 93005 ELECTROCARDIOGRAM TRACING: CPT

## 2022-05-22 PROCEDURE — 96374 THER/PROPH/DIAG INJ IV PUSH: CPT

## 2022-05-22 PROCEDURE — 99291 CRITICAL CARE FIRST HOUR: CPT | Mod: 25

## 2022-05-22 PROCEDURE — 25000003 PHARM REV CODE 250: Performed by: STUDENT IN AN ORGANIZED HEALTH CARE EDUCATION/TRAINING PROGRAM

## 2022-05-22 PROCEDURE — 36415 COLL VENOUS BLD VENIPUNCTURE: CPT | Performed by: EMERGENCY MEDICINE

## 2022-05-22 RX ORDER — ESCITALOPRAM OXALATE 20 MG/1
20 TABLET ORAL DAILY
COMMUNITY

## 2022-05-22 RX ORDER — ATENOLOL 50 MG/1
50 TABLET ORAL DAILY
COMMUNITY

## 2022-05-22 RX ORDER — SODIUM CHLORIDE 0.9 % (FLUSH) 0.9 %
10 SYRINGE (ML) INJECTION
Status: DISCONTINUED | OUTPATIENT
Start: 2022-05-22 | End: 2022-05-23 | Stop reason: HOSPADM

## 2022-05-22 RX ORDER — DIGOXIN 250 MCG
250 TABLET ORAL DAILY
COMMUNITY

## 2022-05-22 RX ORDER — ATENOLOL 50 MG/1
50 TABLET ORAL DAILY
Status: DISCONTINUED | OUTPATIENT
Start: 2022-05-23 | End: 2022-05-23 | Stop reason: HOSPADM

## 2022-05-22 RX ORDER — DIGOXIN 250 MCG
250 TABLET ORAL DAILY
Status: DISCONTINUED | OUTPATIENT
Start: 2022-05-23 | End: 2022-05-23 | Stop reason: HOSPADM

## 2022-05-22 RX ORDER — ALPRAZOLAM 0.25 MG/1
0.25 TABLET ORAL 2 TIMES DAILY
COMMUNITY

## 2022-05-22 RX ORDER — ONDANSETRON 2 MG/ML
4 INJECTION INTRAMUSCULAR; INTRAVENOUS
Status: DISPENSED | OUTPATIENT
Start: 2022-05-22 | End: 2022-05-23

## 2022-05-22 RX ORDER — SODIUM CHLORIDE 9 MG/ML
INJECTION, SOLUTION INTRAVENOUS CONTINUOUS
Status: DISCONTINUED | OUTPATIENT
Start: 2022-05-22 | End: 2022-05-23 | Stop reason: HOSPADM

## 2022-05-22 RX ORDER — ALPRAZOLAM 0.25 MG/1
0.25 TABLET ORAL 2 TIMES DAILY
Status: DISCONTINUED | OUTPATIENT
Start: 2022-05-22 | End: 2022-05-23 | Stop reason: HOSPADM

## 2022-05-22 RX ORDER — ESCITALOPRAM OXALATE 10 MG/1
20 TABLET ORAL DAILY
Status: DISCONTINUED | OUTPATIENT
Start: 2022-05-23 | End: 2022-05-23 | Stop reason: HOSPADM

## 2022-05-22 RX ORDER — ONDANSETRON 2 MG/ML
INJECTION INTRAMUSCULAR; INTRAVENOUS
Status: DISPENSED
Start: 2022-05-22 | End: 2022-05-23

## 2022-05-22 RX ORDER — POTASSIUM CHLORIDE 750 MG/1
10 CAPSULE, EXTENDED RELEASE ORAL 2 TIMES DAILY
COMMUNITY

## 2022-05-22 RX ORDER — CLINDAMYCIN PHOSPHATE 600 MG/50ML
600 INJECTION, SOLUTION INTRAVENOUS
Status: DISCONTINUED | OUTPATIENT
Start: 2022-05-23 | End: 2022-05-23 | Stop reason: HOSPADM

## 2022-05-22 RX ORDER — GLIMEPIRIDE 2 MG/1
2 TABLET ORAL
COMMUNITY

## 2022-05-22 RX ORDER — ATORVASTATIN CALCIUM 20 MG/1
20 TABLET, FILM COATED ORAL DAILY
COMMUNITY

## 2022-05-22 RX ORDER — SODIUM CHLORIDE, SODIUM LACTATE, POTASSIUM CHLORIDE, CALCIUM CHLORIDE 600; 310; 30; 20 MG/100ML; MG/100ML; MG/100ML; MG/100ML
INJECTION, SOLUTION INTRAVENOUS
Status: COMPLETED | OUTPATIENT
Start: 2022-05-22 | End: 2022-05-22

## 2022-05-22 RX ORDER — METFORMIN HYDROCHLORIDE 500 MG/1
1000 TABLET ORAL DAILY
COMMUNITY

## 2022-05-22 RX ORDER — ONDANSETRON 2 MG/ML
INJECTION INTRAMUSCULAR; INTRAVENOUS CODE/TRAUMA/SEDATION MEDICATION
Status: COMPLETED | OUTPATIENT
Start: 2022-05-22 | End: 2022-05-22

## 2022-05-22 RX ORDER — GLUCAGON 1 MG
1 KIT INJECTION
Status: DISCONTINUED | OUTPATIENT
Start: 2022-05-22 | End: 2022-05-23 | Stop reason: HOSPADM

## 2022-05-22 RX ORDER — FUROSEMIDE 40 MG/1
40 TABLET ORAL DAILY
COMMUNITY

## 2022-05-22 RX ORDER — LEVETIRACETAM 500 MG/5ML
500 INJECTION, SOLUTION, CONCENTRATE INTRAVENOUS EVERY 12 HOURS
Status: DISCONTINUED | OUTPATIENT
Start: 2022-05-22 | End: 2022-05-23 | Stop reason: HOSPADM

## 2022-05-22 RX ORDER — OMEPRAZOLE 40 MG/1
40 CAPSULE, DELAYED RELEASE ORAL DAILY
COMMUNITY

## 2022-05-22 RX ORDER — ONDANSETRON 2 MG/ML
4 INJECTION INTRAMUSCULAR; INTRAVENOUS
Status: COMPLETED | OUTPATIENT
Start: 2022-05-22 | End: 2022-05-22

## 2022-05-22 RX ORDER — PANTOPRAZOLE SODIUM 40 MG/1
40 TABLET, DELAYED RELEASE ORAL DAILY
Status: DISCONTINUED | OUTPATIENT
Start: 2022-05-23 | End: 2022-05-23 | Stop reason: HOSPADM

## 2022-05-22 RX ORDER — VALACYCLOVIR HYDROCHLORIDE 500 MG/1
500 TABLET, FILM COATED ORAL 2 TIMES DAILY
COMMUNITY

## 2022-05-22 RX ORDER — INSULIN ASPART 100 [IU]/ML
1-10 INJECTION, SOLUTION INTRAVENOUS; SUBCUTANEOUS EVERY 6 HOURS PRN
Status: DISCONTINUED | OUTPATIENT
Start: 2022-05-22 | End: 2022-05-23 | Stop reason: HOSPADM

## 2022-05-22 RX ADMIN — SODIUM CHLORIDE: 900 INJECTION INTRAVENOUS at 11:05

## 2022-05-22 RX ADMIN — LEVETIRACETAM 500 MG: 100 INJECTION, SOLUTION INTRAVENOUS at 06:05

## 2022-05-22 RX ADMIN — ONDANSETRON 4 MG: 2 INJECTION INTRAMUSCULAR; INTRAVENOUS at 05:05

## 2022-05-22 RX ADMIN — SODIUM CHLORIDE, POTASSIUM CHLORIDE, SODIUM LACTATE AND CALCIUM CHLORIDE 1000 ML: 600; 310; 30; 20 INJECTION, SOLUTION INTRAVENOUS at 04:05

## 2022-05-22 RX ADMIN — ONDANSETRON 4 MG: 2 INJECTION INTRAMUSCULAR; INTRAVENOUS at 04:05

## 2022-05-22 NOTE — ED PROVIDER NOTES
"Encounter Date: 5/22/2022    SCRIBE #1 NOTE: I, Derick Chacon, am scribing for, and in the presence of,  Dr. Summers. I have scribed the following portions of the note - Other sections scribed: HPI, ROS, Physical Exam, MDM, Attending.       History     Chief Complaint   Patient presents with    Fall     Ground level fall at home, recent back surgery, ems reports initial gcs 14 and declined to 11 in route. Patient disoriented to time, place, and situation. Inappropriate answers. Trauma level 2.      60 yo CF with history of DM presents to ED following unwitnessed fall today after she was discharged from [facility] [place] surgery.  EMS reports that pt's  left her alone for about 15 minutes, then found her lying unresponsive on the floor when he returned.  They say that pt was initial GCS of 14 and that she complained of a HA.  Her GCS and O2 saturation declined while en route.  Pt is not on thinners and her CBG was 89.  She now complains of nausea and says she does not remember what happened.    Spoke with , Gerald. He reports patient had lumbar surgery to "have part of bone shaved off" 2 days ago and was discharged this afternoon. He says patient was a little "woozy" because of the meds she had been getting. When they arrived home, he put her on the couch and went to pharmacy to get her meds filled. He called her at the pharmacy and told her he would be 10-15 minutes and she was still inside at that time. When he drove up at the house, he was laying outside on the driveway.    The history is provided by the patient and the EMS personnel. The history is limited by the condition of the patient.   Fall  The accident occurred just prior to arrival. Distance fallen: ground level. The point of impact was the head. The pain is present in the head. Associated symptoms include nausea and headaches. Pertinent negatives include no fever, no numbness, no vomiting and no hematuria. Treatment on " scene includes a c-collar.     Review of patient's allergies indicates:  No Known Allergies  Past Medical History:   Diagnosis Date    Diabetes mellitus     Hypertension      Past Surgical History:   Procedure Laterality Date    LAMINECTOMY AND MICRODISCECTOMY LUMBAR SPINE       No family history on file.     Review of Systems   Constitutional: Negative for chills, diaphoresis and fever.   HENT: Negative for congestion and sore throat.    Eyes: Negative for visual disturbance.   Respiratory: Negative for cough and shortness of breath.    Cardiovascular: Negative for chest pain and palpitations.   Gastrointestinal: Positive for nausea. Negative for diarrhea and vomiting.   Genitourinary: Negative for dysuria and hematuria.   Skin: Negative for rash.   Neurological: Positive for headaches. Negative for syncope, weakness and numbness.   All other systems reviewed and are negative.      Physical Exam     Initial Vitals [05/22/22 1607]   BP Pulse Resp Temp SpO2   (!) 127/53 74 20 98.4 °F (36.9 °C) 96 %      MAP       --         Physical Exam    Nursing note and vitals reviewed.  Constitutional: She appears well-developed and well-nourished. She is not diaphoretic. She does not appear ill. No distress.   HENT:   Head: Normocephalic.   Right Ear: External ear normal.   Left Ear: External ear normal.   Mouth/Throat: Oropharynx is clear and moist.   Hematoma to L forehead; dry mucous membranes   Eyes: Conjunctivae and EOM are normal. Pupils are equal, round, and reactive to light.   Pupils 3-2 mm bilaterally     Neck: No tracheal deviation present.   In cervical collar   Cardiovascular: Normal rate, regular rhythm, normal heart sounds and intact distal pulses.   No murmur heard.  2+ bilateral radial and dorsalis pedis pulses   Pulmonary/Chest: Breath sounds normal. No respiratory distress. She has no wheezes. She has no rhonchi. She has no rales.   Abdominal: Abdomen is soft. Bowel sounds are normal. She exhibits no  distension. There is no abdominal tenderness.   No right CVA tenderness.  No left CVA tenderness.   Musculoskeletal:         General: Normal range of motion.     Neurological: She is alert. She has normal strength. No cranial nerve deficit or sensory deficit. GCS eye subscore is 3. GCS verbal subscore is 4. GCS motor subscore is 6.   Equal strength in upper and lower extremities; no facial droop; ; oriented to place and person but not time   Skin: Skin is warm and dry. Capillary refill takes less than 2 seconds. No rash noted. No pallor.   Abrasion to posterior aspect of L shoulder; abrasions to L arm; surgical incision to lumbar area (clean, dry, intact with appropriate tenderness)   Psychiatric: She has a normal mood and affect. Her behavior is normal.         ED Course   Critical Care    Date/Time: 5/22/2022 10:08 PM  Performed by: Minerva Summers MD  Authorized by: Minerva Summers MD   Direct patient critical care time: 15 minutes  Additional history critical care time: 10 minutes  Ordering / reviewing critical care time: 5 minutes  Documentation critical care time: 3 minutes  Consulting other physicians critical care time: 5 minutes  Consult with family critical care time: 3 minutes  Total critical care time (exclusive of procedural time) : 41 minutes  Critical care was necessary to treat or prevent imminent or life-threatening deterioration of the following conditions: cardiac failure, trauma, CNS failure or compromise and respiratory failure.  Critical care was time spent personally by me on the following activities: development of treatment plan with patient or surrogate, discussions with consultants, evaluation of patient's response to treatment, examination of patient, obtaining history from patient or surrogate, ordering and performing treatments and interventions, ordering and review of laboratory studies, ordering and review of radiographic studies, pulse oximetry, re-evaluation of patient's condition  and review of old charts.        Labs Reviewed   COMPREHENSIVE METABOLIC PANEL - Abnormal; Notable for the following components:       Result Value    Potassium Level 3.1 (*)     Carbon Dioxide 32 (*)     Blood Urea Nitrogen 9.4 (*)     Calcium Level Total 8.0 (*)     Protein Total 5.4 (*)     Albumin Level 3.1 (*)     Globulin 2.3 (*)     All other components within normal limits   CBC WITH DIFFERENTIAL - Abnormal; Notable for the following components:    WBC 11.8 (*)     RBC 3.47 (*)     Hgb 11.6 (*)     Hct 34.6 (*)     MCV 99.7 (*)     MCH 33.4 (*)     IG# 0.05 (*)     All other components within normal limits   PROTIME-INR - Normal   APTT - Normal   LACTIC ACID, PLASMA - Normal   ALCOHOL,MEDICAL (ETHANOL) - Normal   LACTIC ACID, PLASMA - Normal   CBC W/ AUTO DIFFERENTIAL    Narrative:     The following orders were created for panel order CBC auto differential.  Procedure                               Abnormality         Status                     ---------                               -----------         ------                     CBC with Differential[[id number]]        Abnormal            Final result                 Please view results for these tests on the individual orders.   URINALYSIS, REFLEX TO URINE CULTURE   DRUG SCREEN, URINE (BEAKER)   TYPE & SCREEN          Imaging Results          CT Maxillofacial Without Contrast (In process)                CT Chest Without Contrast (In process)                CT Thoracic Spine Without Contrast (Preliminary result)  Result time 05/22/22 17:08:13    Preliminary result by Kurt Nina MD (05/22/22 17:08:13)                 Narrative:    START OF REPORT:  Technique: CT of the thoracic spine without contrast with direct axial as well as sagittal and coronal reconstruction images.    Comparison: None.    Dosage Information: Automated Exposure Control was utilized.    Clinical History: Had back sx x 2 days ago; found down by  in  driveway.    Findings:  Mineralization of the bony structures: Within normal limits for age.  Bone marrow:  Curvature: Normal thoracic kyphosis.  Fractures: No fracture dislocation or subluxation is identified.  Degenerative changes: Subtle scattered thoracic spine spondylosis is seen.  Intervertebral disc spaces: Preserved throughout.  Osteophytes: Mild anterior osteophytes are seen.  Endplates: No significant endplate sclerosis.  Facet degenerative changes: Mild bilateral multilevel facet degenerative changes are seen.  Spinal canal: Unremarkable.    Miscellaneous: Tiny calcifications are noted in the left thyroid lobe. Correlate clinically and with other laboratory findings. Streaky and linear opacities are seen in the visualized lungs likely due to nonspecific dependent changes. There are also areas of dense subpleural opacity along the right lateral chest as well as the right lung apex which likely reflect areas of atelectasis or scarring with underlying nodular components not excluded.      Impression:  1. Tiny calcifications are noted in the left thyroid lobe. Correlate clinically and with other laboratory findings. Streaky and linear opacities are seen in the visualized lungs likely due to nonspecific dependent changes. There are also areas of dense subpleural opacity along the right lateral chest as well as the right lung apex which likely reflect areas of atelectasis or scarring with underlying nodular components not excluded. Correlate with clinical and laboratory findings as regards additional evaluation and follow up.  2. No fracture dislocation or subluxation is seen. Degenerative changes as above. Details and other findings as above.                                 CT Lumbar Spine Without Contrast (Preliminary result)  Result time 05/22/22 17:06:36    Preliminary result by Kurt Nina MD (05/22/22 17:06:36)                 Narrative:    START OF REPORT:  Technique: CT of the lumbar spine was  performed without intravenous contrast with direct axial as well as sagittal and coronal reconstruction images.    Comparison: None.    Clinical history: None.    Findings:  Mineralization: The bony mineralization is within normal limits for age.  Bone alignment: Unremarkable with no significant listhesis.  Bone and bone marrow: Vertebral body heights are maintained.  Intervertebral disc spaces: There is vacuum disc phenomenon at L4-L5 with the rest of the disc heights appearing preserved.  Osteophytes: Multilevel anterior marginal endplate osteophytes are noted.  Endplate Sclerosis: No significant endplate sclerosis is seen.  Spinal canal: Unremarkable with no bony spinal canal stenosis identified.  Findings at specific levels:  L4- 5: There has been right-sided hemilaminectomy with some gas in thehemi laminectomy surgical bed which likely is postoperative. There is a moderate disc bulge at the L4-L5 level with vacuum disc phenomenon at this level. This disc bulge causes narrowing of the right-sided neural foramina as well as likely displacing the bilateral transiting nerve roots at this level. No specific epidural hemorrhage is identified at this level.      Impression:  1. There has been right-sided hemilaminectomy with some gas in thehemi laminectomy surgical bed which likely is postoperative. There is a moderate disc bulge at the L4-L5 level with vacuum disc phenomenon at this level. This disc bulge causes narrowing of the right-sided neural foramina as well as likely displacing the bilateral transiting nerve roots at this level. No specific epidural hemorrhage is identified at this level.  2. Degenerative and postsurgical changes as above. No fracture or dislocation or subluxation is identified.                                 CT Cervical Spine Without Contrast (Preliminary result)  Result time 05/22/22 17:05:46    Preliminary result by Kurt Nina MD (05/22/22 17:05:46)                 Narrative:    START  OF REPORT:  Technique: CT of the cervical spine was performed without intravenous contrast with axial as well as sagittal and coronal images.    Comparison: None.    Dosage Information: Automated exposure control was utilized.    Clinical history: Had back sx x 2 days ago; found down by  in driveway.    Findings:  Lung apices: Lung findings discussed on thoracic spine CT.  Spine:  Spinal canal: The spinal canal appears unremarkable.  Mineralization: Within normal limits for age.  Rotation: No significant rotation is seen.  Scoliosis: No significant scoliosis is seen.  Vertebral Fusion: No vertebral fusion is identified.  Listhesis: No significant listhesis is identified.  Lordosis: The cervical lordosis is maintained.  Intervertebral disc spaces: The intervertebral discs are preserved throughout.  Osteophytes: Pronounced multilevel endplate osteophytes are seen.  Endplate Sclerosis: No significant endplate sclerosis is seen.  Uncovertebral degenerative changes: Mild multilevel uncovertebral joint arthrosis is seen.  Facet degenerative changes: Mild multilevel facet degenerative changes are seen.  Fractures: No acute fracture dislocation or subluxation is seen.      Impression:  1. No acute fracture dislocation or subluxation is seen.  2. Degenerative changes and other details as above.                                 CT Head Without Contrast (Final result)  Result time 05/22/22 17:01:07    Final result by Daryl Sewell MD (05/22/22 17:01:07)                 Impression:      1.  Fractures with depressions involving the outer table of the left frontal sinus with left frontal sinus hyperdense hemorrhagic fluid.    2.  Minimal focal fracture inner table of the left frontal sinus without pneumocephalus.    3.  Artifact versus punctate hyperdense hemorrhage right frontal lobe.    Findings were notified to Dr. Summers May 22, 2022 at 17:00 hours.      Electronically signed by: Daryl  Melodie  Date:    05/22/2022  Time:    17:01             Narrative:    EXAMINATION:  CT HEAD WITHOUT CONTRAST    CLINICAL HISTORY:  Trauma;    TECHNIQUE:  Sequential axial images were performed of the brain without contrast.    Dose product length of 2562 mGycm. Automated exposure control was utilized to minimize radiation dose.    COMPARISON:  None available.    FINDINGS:  There are fractures with depressions which involve the outer table of the left frontal sinus and the left frontal sinus is occupied by hyperdense fluid.  There is also minimal focal fracture defect of the inner table of the left neural sinus which is seen on image 23 series 7.  There is no apparent pneumocephalus.    There is an artifact versus punctate hyperdense hemorrhage right frontal lobe on image 23 series 5.  There is no intracranial mass effect, midline shift or hydrocephalus. There is no sulcal effacement or low attenuation changes to suggest recent large vessel territory infarction. Chronic appearing periventricular and subcortical white matter low attenuation changes are present and are consistent with chronic microangiopathic ischemia. The ventricular system and sulcal markings prominence is consistent with atrophy. There is no acute extra axial fluid collection.                               X-Ray Chest 1 View (Final result)  Result time 05/22/22 16:26:35    Final result by Daryl Sewell MD (05/22/22 16:26:35)                 Impression:      As above.      Electronically signed by: Daryl Sewell  Date:    05/22/2022  Time:    16:26             Narrative:    EXAMINATION:  XR CHEST 1 VIEW    CLINICAL HISTORY:  r/o bleeding or hemorrhage;    TECHNIQUE:  One view    COMPARISON:  None available.    FINDINGS:  Cardiopericardial silhouette is within normal limits.  Lungs hyperinflation accentuates bronchovascular appearance without exclusion of mild congestive process.  There is no focally dense consolidation, fluid within the pleural  space or pneumothorax.                              X-Rays:   Independently Interpreted Readings:   Chest X-Ray: Normal heart size.  No acute abnormalities.     Medications   lactated ringers bolus 1,000 mL (1,000 mLs Intravenous Not Given 5/22/22 1630)   ondansetron injection 4 mg (4 mg Intravenous Not Given 5/22/22 1630)   levETIRAcetam injection 500 mg (500 mg Intravenous Given 5/22/22 1813)   sodium chloride 0.9% flush 10 mL (has no administration in time range)   0.9%  NaCl infusion (has no administration in time range)   lactated ringers infusion ( Intravenous Stopped 5/22/22 1853)   ondansetron injection ( Intramuscular Not Given 5/22/22 1615)   ondansetron injection 4 mg (4 mg Intravenous Given 5/22/22 1733)     Medical Decision Making:   History:   I obtained history from: someone other than patient and EMS provider.       <> Summary of History: 62 yo female with AMS after fall discharged from surgical center today after lumbar surgery   Initial Assessment:   Level II Trauma   ABCs intact, alerted with GCS 13, vitals stable    Differential Diagnosis:   Intracranial hemorrhage, stroke, closed head injury, metabolic encephalopathy, spine injury  Independently Interpreted Test(s):   I have ordered and independently interpreted X-rays - see prior notes.  Clinical Tests:   Lab Tests: Ordered and Reviewed  Radiological Study: Ordered and Reviewed  ED Management:  CXR clear   CTH with possible punctate hemorrhage, frontal sinus fx  NSGY consulted  Given Keppra   Admitted to trauma service   Other:   I have discussed this case with another health care provider.       <> Summary of the Discussion: NSGY for consultation   Trauma surgery for admission           Scribe Attestation:   Scribe #1: I performed the above scribed service and the documentation accurately describes the services I performed. I attest to the accuracy of the note.    Attending Attestation:           Physician Attestation for Scribe:  Physician  Attestation Statement for Scribe #1: I, Dr. Summers, reviewed documentation, as scribed by Derick Chacon in my presence, and it is both accurate and complete.             ED Course as of 05/22/22 2211   Sun May 22, 2022   1701 Radiology called with possible punctate hemorrhage, will page NSGY to look at scans   [KM]   1723 [place] (NSGY)- recs ICU admission, repeat scan in AM, neuro checks and BP control  [KM]   1725 Paging surgical hospitalist for admission  [KM]   1737 Spoke with Dr Hermosillo (surgery resident) for admission  [KM]      ED Course User Index  [KM] Minerva Summers MD             Clinical Impression:   Final diagnoses:  [I61.1] Punctate hemorrhage of frontal lobe (Primary)  [S02.19XA] Closed fracture of frontal sinus, initial encounter  [Z98.890] Status post lumbar spine operation  [R41.0] Confusion  [S09.90XA] Injury of head, initial encounter  [W19.XXXA] Fall, initial encounter          ED Disposition Condition    Admit               Minerva Summers MD  05/22/22 2211

## 2022-05-22 NOTE — ED TRIAGE NOTES
"Patient identifiers verified and correct for Mrs. Morales. Pt arrives to ED as trauma level 2 for a ground level fall. Pt was found outside on driveway after being d/c'd from surgical hospital this afternoon. Hematoma to left forehead. Pt was GCS 14 upon finding but decreased to 11 with EMS. Assumed care of pt at this time.  LOC: The patient is awake, alert, oriented to self only. Pt opens eyes to voice. Speaks clear. Follows commands. iAPPEARANCE: Patient appears comfortable and in no acute distress,   SKIN: The skin is warm and dry, color consistent with ethnicity, patient has normal skin turgor. Left forehead hematoma and left posterior shoulder abrasions.  MUSCULOSKELETAL: Patient moving all extremities spontaneously, no swelling noted.  RESPIRATORY: Airway is open and patent, respirations are spontaneous, patient has a normal effort and rate, no accessory muscle use noted, pt placed on continuous pulse ox with O2 sats noted at 97% on 3 L NC.  CARDIAC: Pt placed on cardiac monitor. Patient has a normal rate and regular rhythm, no edema noted, capillary refill < 3 seconds.   GASTRO: Soft and non tender to palpation, no distention noted, normoactive bowel sounds present in all four quadrants. Pt states bowel movements have been regular.  : Pt denies any pain or frequency with urination.  NEURO: Pt opens eyes to voice, pt repeating statements like "I need to urinate". Oriented to self only. Follows commands.     "

## 2022-05-22 NOTE — ED NOTES
----- Message from Concha Fuller sent at 2/8/2018 10:02 AM CST -----  Contact: pt  States her appt was rescheduled and she would like to speak to the nurse. Please call pt at [phone]. Thank you    Bed: 15  Expected date:   Expected time:   Means of arrival:   Comments:  Trauma patient

## 2022-05-23 VITALS
BODY MASS INDEX: 32.14 KG/M2 | OXYGEN SATURATION: 83 % | HEART RATE: 62 BPM | SYSTOLIC BLOOD PRESSURE: 99 MMHG | WEIGHT: 200 LBS | RESPIRATION RATE: 25 BRPM | DIASTOLIC BLOOD PRESSURE: 50 MMHG | TEMPERATURE: 98 F | HEIGHT: 66 IN

## 2022-05-23 LAB
ALBUMIN SERPL-MCNC: 2.8 GM/DL (ref 3.4–4.8)
ALBUMIN/GLOB SERPL: 1 RATIO (ref 1.1–2)
ALP SERPL-CCNC: 74 UNIT/L (ref 40–150)
ALT SERPL-CCNC: 12 UNIT/L (ref 0–55)
AMPHET UR QL SCN: NEGATIVE
APPEARANCE UR: CLEAR
AST SERPL-CCNC: 18 UNIT/L (ref 5–34)
BACTERIA #/AREA URNS AUTO: NORMAL /HPF
BARBITURATE SCN PRESENT UR: NEGATIVE
BASOPHILS # BLD AUTO: 0.03 X10(3)/MCL (ref 0–0.2)
BASOPHILS NFR BLD AUTO: 0.3 %
BENZODIAZ UR QL SCN: POSITIVE
BILIRUB UR QL STRIP.AUTO: NEGATIVE MG/DL
BILIRUBIN DIRECT+TOT PNL SERPL-MCNC: 0.9 MG/DL
BUN SERPL-MCNC: 7.1 MG/DL (ref 9.8–20.1)
CALCIUM SERPL-MCNC: 8 MG/DL (ref 8.4–10.2)
CANNABINOIDS UR QL SCN: NEGATIVE
CHLORIDE SERPL-SCNC: 100 MMOL/L (ref 98–107)
CO2 SERPL-SCNC: 29 MMOL/L (ref 23–31)
COCAINE UR QL SCN: NEGATIVE
COLOR UR AUTO: YELLOW
CREAT SERPL-MCNC: 0.7 MG/DL (ref 0.55–1.02)
EOSINOPHIL # BLD AUTO: 0.05 X10(3)/MCL (ref 0–0.9)
EOSINOPHIL NFR BLD AUTO: 0.5 %
ERYTHROCYTE [DISTWIDTH] IN BLOOD BY AUTOMATED COUNT: 12.7 % (ref 11.5–17)
FENTANYL UR QL SCN: NEGATIVE
GLOBULIN SER-MCNC: 2.8 GM/DL (ref 2.4–3.5)
GLUCOSE SERPL-MCNC: 96 MG/DL (ref 82–115)
GLUCOSE UR QL STRIP.AUTO: NEGATIVE MG/DL
HCT VFR BLD AUTO: 33 % (ref 37–47)
HGB BLD-MCNC: 10.9 GM/DL (ref 12–16)
IMM GRANULOCYTES # BLD AUTO: 0.04 X10(3)/MCL (ref 0–0.02)
IMM GRANULOCYTES NFR BLD AUTO: 0.4 % (ref 0–0.43)
KETONES UR QL STRIP.AUTO: NEGATIVE MG/DL
LEUKOCYTE ESTERASE UR QL STRIP.AUTO: NEGATIVE UNIT/L
LYMPHOCYTES # BLD AUTO: 2.31 X10(3)/MCL (ref 0.6–4.6)
LYMPHOCYTES NFR BLD AUTO: 22.8 %
MAGNESIUM SERPL-MCNC: 1.2 MG/DL (ref 1.6–2.6)
MCH RBC QN AUTO: 33.3 PG (ref 27–31)
MCHC RBC AUTO-ENTMCNC: 33 MG/DL (ref 33–36)
MCV RBC AUTO: 100.9 FL (ref 80–94)
MDMA UR QL SCN: NEGATIVE
MONOCYTES # BLD AUTO: 0.81 X10(3)/MCL (ref 0.1–1.3)
MONOCYTES NFR BLD AUTO: 8 %
NEUTROPHILS # BLD AUTO: 6.9 X10(3)/MCL (ref 2.1–9.2)
NEUTROPHILS NFR BLD AUTO: 68 %
NITRITE UR QL STRIP.AUTO: NEGATIVE
NRBC BLD AUTO-RTO: 0 %
OPIATES UR QL SCN: POSITIVE
PCP UR QL: NEGATIVE
PH UR STRIP.AUTO: 8 [PH]
PH UR: 8 [PH] (ref 3–11)
PHOSPHATE SERPL-MCNC: 4.4 MG/DL (ref 2.3–4.7)
PLATELET # BLD AUTO: 184 X10(3)/MCL (ref 130–400)
PMV BLD AUTO: 9.6 FL (ref 9.4–12.4)
POCT GLUCOSE: 152 MG/DL (ref 70–110)
POCT GLUCOSE: 72 MG/DL (ref 70–110)
POCT GLUCOSE: 79 MG/DL (ref 70–110)
POCT GLUCOSE: 80 MG/DL (ref 70–110)
POTASSIUM SERPL-SCNC: 3.4 MMOL/L (ref 3.5–5.1)
PROT SERPL-MCNC: 5.6 GM/DL (ref 5.8–7.6)
PROT UR QL STRIP.AUTO: NEGATIVE MG/DL
RBC # BLD AUTO: 3.27 X10(6)/MCL (ref 4.2–5.4)
RBC #/AREA URNS AUTO: <5 /HPF
RBC UR QL AUTO: NEGATIVE UNIT/L
SODIUM SERPL-SCNC: 140 MMOL/L (ref 136–145)
SP GR UR STRIP.AUTO: 1.01 (ref 1–1.03)
SPECIFIC GRAVITY, URINE AUTO (.000) (OHS): 1.01 (ref 1–1.03)
SQUAMOUS #/AREA URNS AUTO: <4 /LPF
UROBILINOGEN UR STRIP-ACNC: 2 MG/DL
WBC # SPEC AUTO: 10.1 X10(3)/MCL (ref 4.5–11.5)
WBC #/AREA URNS AUTO: <5 /HPF

## 2022-05-23 PROCEDURE — 63600175 PHARM REV CODE 636 W HCPCS: Performed by: STUDENT IN AN ORGANIZED HEALTH CARE EDUCATION/TRAINING PROGRAM

## 2022-05-23 PROCEDURE — 25000003 PHARM REV CODE 250: Performed by: STUDENT IN AN ORGANIZED HEALTH CARE EDUCATION/TRAINING PROGRAM

## 2022-05-23 PROCEDURE — 83735 ASSAY OF MAGNESIUM: CPT | Performed by: STUDENT IN AN ORGANIZED HEALTH CARE EDUCATION/TRAINING PROGRAM

## 2022-05-23 PROCEDURE — 27000221 HC OXYGEN, UP TO 24 HOURS

## 2022-05-23 PROCEDURE — 84100 ASSAY OF PHOSPHORUS: CPT | Performed by: STUDENT IN AN ORGANIZED HEALTH CARE EDUCATION/TRAINING PROGRAM

## 2022-05-23 PROCEDURE — 80307 DRUG TEST PRSMV CHEM ANLYZR: CPT | Performed by: STUDENT IN AN ORGANIZED HEALTH CARE EDUCATION/TRAINING PROGRAM

## 2022-05-23 PROCEDURE — 81001 URINALYSIS AUTO W/SCOPE: CPT | Performed by: STUDENT IN AN ORGANIZED HEALTH CARE EDUCATION/TRAINING PROGRAM

## 2022-05-23 PROCEDURE — 94799 UNLISTED PULMONARY SVC/PX: CPT

## 2022-05-23 PROCEDURE — 80053 COMPREHEN METABOLIC PANEL: CPT | Performed by: STUDENT IN AN ORGANIZED HEALTH CARE EDUCATION/TRAINING PROGRAM

## 2022-05-23 PROCEDURE — 36415 COLL VENOUS BLD VENIPUNCTURE: CPT | Performed by: STUDENT IN AN ORGANIZED HEALTH CARE EDUCATION/TRAINING PROGRAM

## 2022-05-23 PROCEDURE — 85025 COMPLETE CBC W/AUTO DIFF WBC: CPT | Performed by: STUDENT IN AN ORGANIZED HEALTH CARE EDUCATION/TRAINING PROGRAM

## 2022-05-23 PROCEDURE — 94761 N-INVAS EAR/PLS OXIMETRY MLT: CPT

## 2022-05-23 RX ORDER — MORPHINE SULFATE 4 MG/ML
2 INJECTION, SOLUTION INTRAMUSCULAR; INTRAVENOUS
Status: DISCONTINUED | OUTPATIENT
Start: 2022-05-23 | End: 2022-05-23 | Stop reason: HOSPADM

## 2022-05-23 RX ORDER — MORPHINE SULFATE 4 MG/ML
2 INJECTION, SOLUTION INTRAMUSCULAR; INTRAVENOUS ONCE
Status: COMPLETED | OUTPATIENT
Start: 2022-05-23 | End: 2022-05-23

## 2022-05-23 RX ORDER — DEXTROMETHORPHAN HYDROBROMIDE, GUAIFENESIN 5; 100 MG/5ML; MG/5ML
650 LIQUID ORAL EVERY 8 HOURS
Qty: 15 TABLET | Refills: 0 | Status: SHIPPED | OUTPATIENT
Start: 2022-05-23 | End: 2022-05-28

## 2022-05-23 RX ORDER — HYDRALAZINE HYDROCHLORIDE 20 MG/ML
10 INJECTION INTRAMUSCULAR; INTRAVENOUS EVERY 6 HOURS PRN
Status: DISCONTINUED | OUTPATIENT
Start: 2022-05-23 | End: 2022-05-23 | Stop reason: HOSPADM

## 2022-05-23 RX ORDER — ENOXAPARIN SODIUM 100 MG/ML
30 INJECTION SUBCUTANEOUS EVERY 12 HOURS
Status: DISCONTINUED | OUTPATIENT
Start: 2022-05-23 | End: 2022-05-23 | Stop reason: HOSPADM

## 2022-05-23 RX ORDER — OXYCODONE HYDROCHLORIDE 5 MG/1
10 TABLET ORAL EVERY 6 HOURS PRN
Status: DISCONTINUED | OUTPATIENT
Start: 2022-05-23 | End: 2022-05-23 | Stop reason: HOSPADM

## 2022-05-23 RX ADMIN — ENOXAPARIN SODIUM 30 MG: 40 INJECTION SUBCUTANEOUS at 10:05

## 2022-05-23 RX ADMIN — MORPHINE SULFATE 2 MG: 4 INJECTION INTRAVENOUS at 01:05

## 2022-05-23 RX ADMIN — CLINDAMYCIN PHOSPHATE 600 MG: 600 INJECTION, SOLUTION INTRAVENOUS at 10:05

## 2022-05-23 RX ADMIN — PANTOPRAZOLE SODIUM 40 MG: 40 TABLET, DELAYED RELEASE ORAL at 08:05

## 2022-05-23 RX ADMIN — ESCITALOPRAM OXALATE 20 MG: 10 TABLET ORAL at 08:05

## 2022-05-23 RX ADMIN — CLINDAMYCIN PHOSPHATE 600 MG: 600 INJECTION, SOLUTION INTRAVENOUS at 01:05

## 2022-05-23 RX ADMIN — LEVETIRACETAM 500 MG: 100 INJECTION, SOLUTION INTRAVENOUS at 08:05

## 2022-05-23 RX ADMIN — ATENOLOL 50 MG: 50 TABLET ORAL at 08:05

## 2022-05-23 RX ADMIN — ALPRAZOLAM 0.25 MG: 0.25 TABLET ORAL at 08:05

## 2022-05-23 RX ADMIN — DIGOXIN 250 MCG: 250 TABLET ORAL at 08:05

## 2022-05-23 RX ADMIN — OXYCODONE HYDROCHLORIDE 10 MG: 5 TABLET ORAL at 03:05

## 2022-05-23 RX ADMIN — SODIUM CHLORIDE: 900 INJECTION INTRAVENOUS at 08:05

## 2022-05-23 RX ADMIN — MORPHINE SULFATE 2 MG: 4 INJECTION INTRAVENOUS at 06:05

## 2022-05-23 NOTE — PT/OT/SLP PROGRESS
Occupational Therapy      Patient Name:  JAG NORIEGA   MRN:  [id number]    OT eval orders received. Pt declined stating she did not sleep well last night and would like to rest. Will follow up as 5/24.    5/23/2022

## 2022-05-23 NOTE — DISCHARGE SUMMARY
[facility] - 7th Floor ICU  General Surgery  Discharge Summary      Patient Name: JAG NORIEGA  MRN: [id number]  Admission Date: 5/22/2022  Hospital Length of Stay: 1 days  Discharge Date and Time:  05/23/2022 4:06 PM  Attending Physician: Isauro Nice MD   Discharging Provider: Clifford Levine MD  Primary Care Provider: Obi Bernstein MD     Admission HPI: Ms. Morales is a 61 y.o. female with history of diabetes, CHF, anxiety, and pulmonary embolism presents as a level 2 trauma status post fall.  She had back surgery 2 days ago, L4-L5 laminectomy, and was discharged home yesterday afternoon.  She was standing in the driveway when her  left her alone for approximately 15 minutes when he returned he found her laying on the ground.  She was found to be confused compared to baseline with a left frontal sinus fracture, and punctate hemorrhage in the right frontal lobe, also with questionable left-sided anterior rib fractures.  She has a GCS 14 and was seen by Neurosurgery who is recommending ICU admission and repeat CT head in the morning.  ENT was consulted for facial fractures.  She is not currently on anticoagulation for her history of clots.    * No surgery found *     Hospital Course: Patient was admitted to the ICU for observation while neurosurgery evaluated patient. It was deemed that no intracranial abnormality was present. Plastic surgery found that no intervention was warranted for her sinus fractures, nor was follow up. Patient will follow up with Dr. Stark for a post op appointment. She will also need to arrange follow up with her PCP for further surveillance of pulmonary nodules found incidentally on imaging. Repeat CT Chest is advised in 3 months.    Consults:   Consults (From admission, onward)        Status Ordering Provider     Inpatient consult to Plastic Surgery  Once        Provider:  Reza Russ MD    Completed SHAVON GATES     Inpatient consult to  Neurosurgery  Once        Provider:  Ayad Burris MD    Completed SHAVON GATES          Significant Diagnostic Studies: Labs:  Radiology:    Pending Diagnostic Studies:     None        Final Active Diagnoses:    Diagnosis Date Noted POA    PRINCIPAL PROBLEM:  Fall as cause of accidental injury at home as place of occurrence [W19.XXXA, Y92.009] 05/22/2022 Not Applicable      Problems Resolved During this Admission:      Discharged Condition: good    Disposition: Home or Self Care    Follow Up: Dr. Stark for post op appointment    Patient Instructions:      Diet Adult Regular     Notify your health care provider if you experience any of the following:  severe uncontrolled pain     Notify your health care provider if you experience any of the following:  persistent nausea and vomiting or diarrhea     Notify your health care provider if you experience any of the following:  severe persistent headache     No dressing needed     Activity as tolerated     Medications:  Reconciled Home Medications:      Medication List      START taking these medications    acetaminophen 650 MG Tbsr  Commonly known as: TYLENOL  Take 1 tablet (650 mg total) by mouth every 8 (eight) hours. for 5 days        CONTINUE taking these medications    ALPRAZolam 0.25 MG tablet  Commonly known as: XANAX  Take 0.25 mg by mouth 2 (two) times a day.     atenoloL 50 MG tablet  Commonly known as: TENORMIN  Take 50 mg by mouth once daily.     atorvastatin 20 MG tablet  Commonly known as: LIPITOR  Take 20 mg by mouth once daily.     digoxin 250 mcg tablet  Commonly known as: LANOXIN  Take 250 mcg by mouth once daily.     EScitalopram oxalate 20 MG tablet  Commonly known as: LEXAPRO  Take 20 mg by mouth once daily.     furosemide 40 MG tablet  Commonly known as: LASIX  Take 40 mg by mouth once daily.     glimepiride 2 MG tablet  Commonly known as: AMARYL  Take 2 mg by mouth before breakfast.     metFORMIN 500 MG tablet  Commonly known as:  GLUCOPHAGE  Take 1,000 mg by mouth once daily.     omeprazole 40 MG capsule  Commonly known as: PRILOSEC  Take 40 mg by mouth once daily.     potassium chloride 10 MEQ Cpsr  Commonly known as: MICRO-K  Take 10 mEq by mouth 2 (two) times daily.     valACYclovir 500 MG tablet  Commonly known as: VALTREX  Take 500 mg by mouth 2 (two) times daily.            Clifford Levine MD  General Surgery  [facility] - 7th Floor ICU

## 2022-05-23 NOTE — NURSING
Patient and  Gerald were educated on follow up appointments, when to seeking emergency medical attention, and  smoking cessation. Patient was leaving in a private vehicle driven by , Gerald. She was on room air, calm, and ready to go home.

## 2022-05-23 NOTE — TERTIARY TRAUMA SURVEY NOTE
TERTIARY TRAUMA SURVEY (TTS)    List Injuries Identified to Date:  1. Left frontal sinus fracture  2. Age indeterminate left 2-3 rib fractures  3. Pulmonary nodular densities  4. Trace bilateral pleural effusions    List Operative and Procedures:  1. None    Past Surgical History:   Procedure Laterality Date    LAMINECTOMY AND MICRODISCECTOMY LUMBAR SPINE         Physical Exam  Vitals reviewed.   Constitutional:       Appearance: Normal appearance. She is normal weight.   HENT:      Head: Normocephalic.      Comments: Scattered abrasions to face     Right Ear: External ear normal.      Left Ear: External ear normal.      Nose: Nose normal.      Mouth/Throat:      Mouth: Mucous membranes are moist.      Pharynx: Oropharynx is clear.   Eyes:      Extraocular Movements: Extraocular movements intact.      Conjunctiva/sclera: Conjunctivae normal.      Pupils: Pupils are equal, round, and reactive to light.   Cardiovascular:      Rate and Rhythm: Normal rate and regular rhythm.      Pulses: Normal pulses.      Heart sounds: Normal heart sounds.   Pulmonary:      Effort: Pulmonary effort is normal.      Breath sounds: Normal breath sounds.   Abdominal:      General: Abdomen is flat. Bowel sounds are normal.      Palpations: Abdomen is soft.   Musculoskeletal:         General: Normal range of motion.      Cervical back: Normal range of motion and neck supple.      Comments: Abrasions to left elbow  Laminectomy incision clean, dry, intact   Skin:     General: Skin is warm and dry.      Capillary Refill: Capillary refill takes less than 2 seconds.   Neurological:      General: No focal deficit present.      Mental Status: She is alert.   Psychiatric:         Mood and Affect: Mood normal.         Imaging Findings Review:  X-Ray Chest 1 View    Result Date: 5/23/2022  EXAMINATION:  XR CHEST 1 VIEW    CPT 76897    CLINICAL HISTORY:  Chest pain;    COMPARISON:  May 22, 2022    FINDINGS:  Examination reveals cardiomediastinal  silhouette improved parenchymal changes to be essentially unchanged as compared with the previous exam        X-Ray Chest 1 View    Result Date: 5/22/2022  EXAMINATION:  XR CHEST 1 VIEW    CLINICAL HISTORY:  r/o bleeding or hemorrhage;    TECHNIQUE:  One view    COMPARISON:  None available.    FINDINGS:  Cardiopericardial silhouette is within normal limits.  Lungs hyperinflation accentuates bronchovascular appearance without exclusion of mild congestive process.  There is no focally dense consolidation, fluid within the pleural space or pneumothorax.        CT Head Without Contrast    Result Date: 5/23/2022    Technique:CT of the head was performed without intravenous contrast with axial as well as coronal and sagittal images.    Comparison:Comparison is with CT study dated 2022-05-22 16:29:35.    Dosage Information:Automated exposure control was utilized.      Clinical history:F/u trauma.    Findings:    Hemorrhage: Interval resolution o right frontal lobe punctate hypodensity which was either an artifact or resolved hemorrhag.  E no acute intracranial hemorrhage is seen.    CSF spaces:The ventricles, sulci and basal cisterns all appear mildly prominent consistent with global cerebral atrophy.    Brain parenchyma:Mild microvascular change is seen in portions of the periventricular and deep white matter tracts.    Cerebellum:Unremarkable.    Vascular:Mild atheromatous calcification of the intracranial arteries is seen.    Calvarium:Again noted is a comminuted minimally displaced fracture involving the anterior wall of the left frontal sinus (series 4, images 16-18) with associated hemosinus.    Maxillofacial Structures:    Paranasal sinuses:Again noted is a comminuted minimally displaced fracture involving the anterior wall of the left frontal sinus (series 4, images 16-18) with associated hemosinus. The rest of the paranasal sinuses appear clear.        CT Head Without Contrast    Result Date:  5/22/2022  EXAMINATION:  CT HEAD WITHOUT CONTRAST    CLINICAL HISTORY:  Trauma;    TECHNIQUE:  Sequential axial images were performed of the brain without contrast.    Dose product length of 2562 mGycm. Automated exposure control was utilized to minimize radiation dose.    COMPARISON:  None available.    FINDINGS:  There are fractures with depressions which involve the outer table of the left frontal sinus and the left frontal sinus is occupied by hyperdense fluid.  There is also minimal focal fracture defect of the inner table of the left neural sinus which is seen on image 23 series 7.  There is no apparent pneumocephalus.    There is an artifact versus punctate hyperdense hemorrhage right frontal lobe on image 23 series 5.  There is no intracranial mass effect, midline shift or hydrocephalus. There is no sulcal effacement or low attenuation changes to suggest recent large vessel territory infarction. Chronic appearing periventricular and subcortical white matter low attenuation changes are present and are consistent with chronic microangiopathic ischemia. The ventricular system and sulcal markings prominence is consistent with atrophy. There is no acute extra axial fluid collection.        CT Chest Without Contrast    Result Date: 5/23/2022  EXAMINATION:  CT CHEST WITHOUT CONTRAST    CLINICAL HISTORY:  Cough, persistent;    TECHNIQUE:  Contiguous axial CT images were acquired from the thoracic inlet to the upper abdomen WITHOUT the administration of intravenous contrast.    Coronal and sagittal reformations were obtained from the axial data set.    Automatic exposure control was utilized to limit radiation dose.    Radiation Dose:    Total DLP: 205 mGy*cm    COMPARISON:  Chest radiograph earlier the same day    FINDINGS:  Lack of intravenous contrast limits sensitivity for the detection of vascular, hilar and enhancing abnormalities.    Neck: The few calcifications and suspicious nodules are seen in thyroid gland  which otherwise appear unremarkable.    Mediastinum: The mediastinal structures are within normal limits.    Heart: The heart size is within normal limits. Coronary artery calcification is seen.    Aorta: Mild aortic calcification is seen in the thoracic aorta.    Pulmonary Arteries: Unremarkable.    Lungs: Scattered streaky linear opacities either due to scarring or subsegmental atelectasis predominantly in the dependent portions at the lung bases consistent with scarring and subsegmental atelectasis. There are bilateral areas of subpleural nodular thickening particularly at the bilateral lung apices in the right lower lobe an image 71 series 4 with some associated calcification. Additionally there is an irregular 9 x 6 mm nodular density at the right upper lung centered on image 42 series 4.    Pleura: There is a trace right sided pleural effusion. There is a small left sided pleural effusion.    Bony Structures:    Spine: Spondylolytic changes are seen in the thoracic spine.    Ribs: No displaced rib fractures are identified.  Nondisplaced fracture of the left 2nd and 3rd ribs anteriorly are a possibility; age indeterminate.    Abdomen: The visualized upper abdominal organs appear unremarkable.        CT Cervical Spine Without Contrast    Result Date: 5/23/2022  EXAMINATION:  CT CERVICAL SPINE WITHOUT CONTRAST    CLINICAL HISTORY:  Trauma;    TECHNIQUE:  Noncontrast CT images of the cervical spine. Axial, coronal, and sagittal reformatted images were obtained. Dose length product is 2562 mGycm. Automatic exposure control, adjustment of mA/kV or iterative reconstruction technique was used to limit radiation dose.    COMPARISON:  None    FINDINGS:  The cervical spine is visualized through the level of T2.    There is no acute fracture identified.  There are mild multilevel degenerative changes with marginal osteophyte formation and facet arthropathy.  There is no paraspinal hematoma.  The thyroid gland is  multinodular with coarse calcifications.  There is biapical scarring.        CT Thoracic Spine Without Contrast    Result Date: 5/23/2022  EXAMINATION:  CT THORACIC SPINE WITHOUT CONTRAST    CLINICAL HISTORY:  fall, recent lumbar surgery;    TECHNIQUE:  Noncontrast CT images of the thoracic spine. Axial, coronal, and sagittal reformatted images were obtained. Dose length product is 2562 mGycm. Automatic exposure control, adjustment of mA/kV or iterative reconstruction technique was used to limit radiation dose.    COMPARISON:  None    FINDINGS:  Thoracic alignment is preserved.  The vertebral body heights are maintained.  There is no acute fracture identified.  There are mild multilevel degenerative changes with marginal osteophyte formation and facet arthropathy.  There is no perispinal hematoma.  There are trace bilateral pleural effusions with bilateral parenchymal scarring and subsegmental atelectasis.        CT Lumbar Spine Without Contrast    Result Date: 5/23/2022  EXAMINATION:  CT LUMBAR SPINE WITHOUT CONTRAST    CLINICAL HISTORY:  fall recent lubar surgery;    TECHNIQUE:  Noncontrast CT images of the lumbar spine. Axial, coronal, and sagittal reformatted images were obtained. Dose length product is 2562 mGycm. Automatic exposure control, adjustment of mA/kV or iterative reconstruction technique was used to limit radiation dose.    COMPARISON:  None    FINDINGS:  There are 5 non-rib-bearing lumbar type vertebral bodies.  There are postoperative changes with prior right-sided hemilaminectomy at L4-5 and L5-S1.  there is no acute fracture identified.  The vertebral heights are maintained.    There is disc height loss at L5-S1 with posterior marginal osteophytes.  There is narrowing of the canal at L4-L5 with disc bulge.  There are neural foraminal stenoses, severe on the left at L5-S1, moderate at L4-5.    There are postoperative changes in the posterior paraspinal soft tissues with heterogeneous fluid in the  laminectomy bed.  Postoperative subcutaneous emphysema is noted.        CT Maxillofacial Without Contrast    Result Date: 5/23/2022    Technique:Noncontrast maxillofacial CT was performed with axial as well as sagittal and coronal images being submitted for interpretation.    Comparison:None.    Clinical history:Fall- bruise lt face, lt chest pain.    Findings:    Contusion: Scalp soft tissue swelling is seen to the left of frontal bone. The swelling is centered on image 59, Series 3. This consistent with a scalp contusion.    Bones:    Orbital bony structures:The bilateral orbital bony structures are intact with no orbital fracture identified.    Mandible:The mandible appears unremarkable with no acute fracture identified.  Surgical wire about the left mandible angle.    Maxilla:The maxilla appears unremarkable with no fracture identified.    Pterygoid plates:No fracture identified of the right or left pterygoid plates.    Zygoma:The zygomatic arches are intact.    TMJ:The mandibular condyles appear normally placed with respect to the mandibular fossa.    Nasal Bones:No displaced nasal bone fracture is seen. Mild leftward deviation of the nasal septum is seen.    Skull:No acute linear or depressed fracture is identified in the visualized skull except for the fractures involving left frontal sinus.    Paranasal sinuses:There are comminuted mildly depressed fractures involving the inner and outer table cortices of left frontal sinus consistent with an open fracture. There is associated hemosinus with fluid level in left frontal sinus and left frontal sinus recess. The rest of the paranasal sinuses appear clear.    Mastoid air cells:The visualized mastoid air cells appear clear.    Brain:The visualized intracranial structures appear unremarkable.    Notification:The results were discussed prior to dictation with the patient's nurse (Rosa) at 2022-05-22 22:41:40 CDT.          Clifford Levine MD  [facility]  Surgery

## 2022-05-23 NOTE — H&P
Pulmonary & Critical Care Medicine      ICU H&P Note    Reason for Admission: Fall from ground level on driveway    HPI:   Ms. Carmen Burns is a 61 year old  female with a past medical history of PE (precipitated after a reported nephrectomy, hysterectomy, and omenectomy in 2014), DM2, GERD, and hyperlipidemia.  She presents after a fall from ground level. Upon returning from the pharmacy her  found her on the ground in their driveway, (stated he was gone for about 15 minutes) and she was weak and slurring her words.  He did not note that she was having any seizure like activity, she denied loss of consciousness but does not recall any of the events leading up to the fall.  Pt GCS of 14 on the scene but decreased to 11 with EMS.  Upon arrival to the ED she was oriented to self only, opened eyes to voice only and was lethargic per ED note.  Vital signs were stable.  CT head was significant for fractures with depressions of the L frontal sinus with associated hemosinus, no pneumocephalus.  Also found possible punctate hyperdense hemorrhages of the R frontal lobe.  She was admitted to the trauma service and brought to the ICU for monitoring of her neuro status.     [Of note, I spoke with her , Mr. Gerald Burns who stated that she had surgery for spinal stenosis on Friday, and was released from the hospital just earlier today.  After being home for about an hour, he left the house to get her medications from the pharmacy which included diazepam, robaxan, norco, and lovenox at a dose for DVT prophylaxis given her history of PE.  She did not take any of the medications.  She had recently seen her hematologist Dr. Zavala and she got a hypercoagulable workup which was negative.]    Past Medical History  Provoked PE, 2014  DM2  GERD  Hyperlipidemia    Past Surgical History  Laminectomy and microdiscectomy of lumbar spine for spinal stenosis, 5/20/22  Pulmonary surgery due to infarct, possibly  related to PE  Nephrectomy, hysterectomy, omenectomy in 2014    Social History:   Tobacco: Current smoker, 45 pack year history  Alcohol: denies  Illicits: denies  Occupation: worked as a     No family history on file.    Drug Allergies:   Review of patient's allergies indicates:  No Known Allergies    Current Infusions:   sodium chloride 0.9%         Scheduled Medications:     ALPRAZolam  0.25 mg Oral BID    [START ON 5/23/2022] atenoloL  50 mg Oral Daily    [START ON 5/23/2022] digoxin  250 mcg Oral Daily    [START ON 5/23/2022] EScitalopram oxalate  20 mg Oral Daily    lactated ringers  1,000 mL Intravenous ED 1 Time    levetiracetam IV  500 mg Intravenous Q12H    ondansetron  4 mg Intravenous ED 1 Time    [START ON 5/23/2022] pantoprazole  40 mg Oral Daily       PRN Medications:   dextrose 10%, dextrose 10%, glucagon (human recombinant), insulin aspart U-100, sodium chloride 0.9%    Review of Systems:   A comprehensive 14-point review of systems was performed, and is negative except for those items mentioned above in the HPI section of this note.     Vital Signs:    Vitals:    05/22/22 2330   BP: (!) 115/57   Pulse: (!) 55   Resp: (!) 21   Temp:        Fluid Balance:     Intake/Output Summary (Last 24 hours) at 5/22/2022 2342  Last data filed at 5/22/2022 1853  Gross per 24 hour   Intake 1000 ml   Output --   Net 1000 ml       Physical Exam:   General: NAD, cooperative & interactive.  HEENT: AT/NC, PERRL, EOMI, nasal and oral mucosa moist. Normal dentition. Oropharynx without exudate.   Neck: Supple without JVD. Trachea midline. Thyroid feels normal.   Cardiac: Regular rate, normal sinus rhythm, S1,S2 heard, no murmurs, rubs, or gallops, with brisk cap refill <2 sec, and symmetric pulses at 2+ in distal extremities.  Respiratory: Normal inspection. Symmetric chest rise. Normal palpation and percussion. Clear to auscultation bilaterally, no wheezes, rales, or rhonchi. No accessory muscle use or  distress  Abdomen: Soft, NT/ND. +BS. No palpable masses. No hepatosplenomegaly.   Lymphatic: No palpable LAD.   Extremities: Normal strength and tone at 5/5 in all extremities. No visible atrophy. No clubbing or cyanosis on nail exam.   Skin: Warm and dry without visible rash. Good skin turgor  Neuro: Grossly intact to brief exam. Exam slightly confounded by patient cooperation.  Exam performed at 2330 5/23/22   Mental status: Patient is alert and oriented to person and time, normal speech.  Does not know location. Thought process intact   Cranial nerves: II, III, IV, VI, fundi are normal, visual acuity without gross deficits.  Pupils round, reactive to light and accomodation.  EOM intact without ptosis.  Shoulder shrug strong and SCM appropriate.    Motor: gross motor exam normal.  Good muscle tone and bulk.  Strength appropriate in biceps, triceps, deltoid, quadriceps, and hamstrings.   Cerebellar: finger to nose and heel to shin test normal      Personal Review and Summary of Prior Diagnostics    Laboratory Studies:   No results for input(s): PH, PCO2, PO2, HCO3, POCSATURATED, BE in the last 24 hours.  Recent Labs   Lab 05/22/22  1622   WBC 11.8*   RBC 3.47*   HGB 11.6*   HCT 34.6*      MCV 99.7*   MCH 33.4*   MCHC 33.5     Recent Labs   Lab 05/22/22  1622   GLUCOSE 76      K 3.1*   CO2 32*   BUN 9.4*   CREATININE 0.80       Microbiology Data:   Microbiology Results (last 7 days)     ** No results found for the last 168 hours. **        Radiology:  Imaging Results          CT Maxillofacial Without Contrast (Preliminary result)  Result time 05/22/22 21:46:50    Preliminary result by Kurt Nina MD (05/22/22 21:46:50)                 Narrative:    START OF REPORT:  Technique: Noncontrast maxillofacial CT was performed with axial as well as sagittal and coronal images being submitted for interpretation.    Comparison: None.    Clinical history: Fall- bruise lt face, lt chest  pain.    Findings:  Facial soft tissues:  Contusion: Mild scalp soft tissue swelling is seen to the left of frontal bone. The swelling is centered on âImage 59, Series 3â. This consistent with a scalp contusion.  Orbital soft tissues: The orbital soft tissues appear unremarkable.  Bones:  Orbital bony structures: The bilateral orbital bony structures are intact with no orbital fracture identified.  Mandible: The mandible appears unremarkable with no fracture identified.  Maxilla: The maxilla appears unremarkable with no fracture identified.  Pterygoid plates: No fracture identified of the right or left pterygoid plates.  Zygoma: The zygomatic arches are intact.  TMJ: The mandibular condyles appear normally placed with respect to the mandibular fossa.  Nasal Bones: No displaced nasal bone fracture is seen. Mild leftward deviation of the nasal septum is seen.  Skull: No acute linear or depressed fracture is identified in the visualized skull except for the fractures involving left frontal sinus.  Paranasal sinuses: There are comminuted mildly depressed fractures involving the inner and outer table cortices of left frontal sinus consistent with an open fracture. There is associated hemosinus with fluid level in left frontal sinus and left frontal sinus recess. The rest of the paranasal sinuses appear clear.  Mastoid air cells: The visualized mastoid air cells appear clear.  Brain: The visualized intracranial structures appear unremarkable.    Notification: The results were discussed prior to dictation with the patient's nurse (Rosa) at 2022-05-22 22:41:40 CDT.      Impression:  1. Mild scalp soft tissue swelling is seen to the left of frontal bone. The swelling is centered on âImage 59, Series 3â. This consistent with a scalp contusion.  2. There are comminuted mildly depressed fractures involving the inner and outer table cortices of left frontal sinus consistent with an open fracture. There is associated  hemosinus with fluid level in left frontal sinus and left frontal sinus recess. Correlate clinically as regard to additional evaluation and follow up.                                 CT Chest Without Contrast (Preliminary result)  Result time 05/22/22 21:45:46    Preliminary result by Kurt Nina MD (05/22/22 21:45:46)                 Narrative:    START OF REPORT:  Technique: CT Scan of the chest was performed without intravenous contrast with direct axial as well as sagittal and, coronal, reconstruction images.    Dosage Information: Automated Exposure Control was utilized.    Comparison: None.    Clinical History: Fall- bruise lt face, lt chest pain-cough.    Findings:  Neck: The few calcifications and suspicious nodules are seen in thyroid gland which otherwise appear unremarkable.  Mediastinum: The mediastinal structures are within normal limits.  Heart: The heart size is within normal limits. Coronary artery calcification is seen.  Aorta: Mild aortic calcification is seen in the thoracic aorta.  Pulmonary Arteries: Unremarkable.  Lungs: Scattered streaky linear opacities either due to scarring or subsegmental atelectasis predominantly in the dependent portions at the lung bases consistent with scarring and subsegmental atelectasis. There are bilateral areas of subpleural nodular thickening particularly at the bilateral lung apices in the right lower lobe an image 71 series 4 with some associated calcification. Additionally there is an irregular 9 x 6 mm nodular density at the right upper lung centered on image 42 series 4.  Pleura: There is a trace right sided pleural effusion. There is a small left sided pleural effusion.  Bony Structures:  Spine: Spondylolytic changes are seen in the thoracic spine.  Ribs: No rib fractures are identified.  Abdomen: The visualized upper abdominal organs appear unremarkable.      Impression:  1. Scattered streaky linear opacities either due to scarring or subsegmental  atelectasis predominantly in the dependent portions at the lung bases consistent with scarring and subsegmental atelectasis. There are bilateral areas of subpleural nodular thickening particularly at the bilateral lung apices in the right lower lobe an image 71 series 4 with some associated calcification. Additionally there is an irregular 9 x 6 mm nodular density at the right upper lung centered on image 42 series 4. While these may reflect scarring and atelectasis the possibility of an underlying nodular or even neoplastic process is not excluded. Correlate with clinical and laboratory findings as regards additional evaluation and follow up. Comparison with prior studiesis recommended if and when made available.  2. There is a trace right sided pleural effusion. There is a small left sided pleural effusion.  3. Details and other findings as discussed above.                                 CT Thoracic Spine Without Contrast (Preliminary result)  Result time 05/22/22 17:08:13    Preliminary result by Kurt Nina MD (05/22/22 17:08:13)                 Narrative:    START OF REPORT:  Technique: CT of the thoracic spine without contrast with direct axial as well as sagittal and coronal reconstruction images.    Comparison: None.    Dosage Information: Automated Exposure Control was utilized.    Clinical History: Had back sx x 2 days ago; found down by  in driveway.    Findings:  Mineralization of the bony structures: Within normal limits for age.  Bone marrow:  Curvature: Normal thoracic kyphosis.  Fractures: No fracture dislocation or subluxation is identified.  Degenerative changes: Subtle scattered thoracic spine spondylosis is seen.  Intervertebral disc spaces: Preserved throughout.  Osteophytes: Mild anterior osteophytes are seen.  Endplates: No significant endplate sclerosis.  Facet degenerative changes: Mild bilateral multilevel facet degenerative changes are seen.  Spinal canal:  Unremarkable.    Miscellaneous: Tiny calcifications are noted in the left thyroid lobe. Correlate clinically and with other laboratory findings. Streaky and linear opacities are seen in the visualized lungs likely due to nonspecific dependent changes. There are also areas of dense subpleural opacity along the right lateral chest as well as the right lung apex which likely reflect areas of atelectasis or scarring with underlying nodular components not excluded.      Impression:  1. Tiny calcifications are noted in the left thyroid lobe. Correlate clinically and with other laboratory findings. Streaky and linear opacities are seen in the visualized lungs likely due to nonspecific dependent changes. There are also areas of dense subpleural opacity along the right lateral chest as well as the right lung apex which likely reflect areas of atelectasis or scarring with underlying nodular components not excluded. Correlate with clinical and laboratory findings as regards additional evaluation and follow up.  2. No fracture dislocation or subluxation is seen. Degenerative changes as above. Details and other findings as above.                                 CT Lumbar Spine Without Contrast (Preliminary result)  Result time 05/22/22 17:06:36    Preliminary result by Kurt Nina MD (05/22/22 17:06:36)                 Narrative:    START OF REPORT:  Technique: CT of the lumbar spine was performed without intravenous contrast with direct axial as well as sagittal and coronal reconstruction images.    Comparison: None.    Clinical history: None.    Findings:  Mineralization: The bony mineralization is within normal limits for age.  Bone alignment: Unremarkable with no significant listhesis.  Bone and bone marrow: Vertebral body heights are maintained.  Intervertebral disc spaces: There is vacuum disc phenomenon at L4-L5 with the rest of the disc heights appearing preserved.  Osteophytes: Multilevel anterior marginal endplate  osteophytes are noted.  Endplate Sclerosis: No significant endplate sclerosis is seen.  Spinal canal: Unremarkable with no bony spinal canal stenosis identified.  Findings at specific levels:  L4- 5: There has been right-sided hemilaminectomy with some gas in thehemi laminectomy surgical bed which likely is postoperative. There is a moderate disc bulge at the L4-L5 level with vacuum disc phenomenon at this level. This disc bulge causes narrowing of the right-sided neural foramina as well as likely displacing the bilateral transiting nerve roots at this level. No specific epidural hemorrhage is identified at this level.      Impression:  1. There has been right-sided hemilaminectomy with some gas in thehemi laminectomy surgical bed which likely is postoperative. There is a moderate disc bulge at the L4-L5 level with vacuum disc phenomenon at this level. This disc bulge causes narrowing of the right-sided neural foramina as well as likely displacing the bilateral transiting nerve roots at this level. No specific epidural hemorrhage is identified at this level.  2. Degenerative and postsurgical changes as above. No fracture or dislocation or subluxation is identified.                                 CT Cervical Spine Without Contrast (Preliminary result)  Result time 05/22/22 17:05:46    Preliminary result by Kurt Nina MD (05/22/22 17:05:46)                 Narrative:    START OF REPORT:  Technique: CT of the cervical spine was performed without intravenous contrast with axial as well as sagittal and coronal images.    Comparison: None.    Dosage Information: Automated exposure control was utilized.    Clinical history: Had back sx x 2 days ago; found down by  in driveway.    Findings:  Lung apices: Lung findings discussed on thoracic spine CT.  Spine:  Spinal canal: The spinal canal appears unremarkable.  Mineralization: Within normal limits for age.  Rotation: No significant rotation is seen.  Scoliosis:  No significant scoliosis is seen.  Vertebral Fusion: No vertebral fusion is identified.  Listhesis: No significant listhesis is identified.  Lordosis: The cervical lordosis is maintained.  Intervertebral disc spaces: The intervertebral discs are preserved throughout.  Osteophytes: Pronounced multilevel endplate osteophytes are seen.  Endplate Sclerosis: No significant endplate sclerosis is seen.  Uncovertebral degenerative changes: Mild multilevel uncovertebral joint arthrosis is seen.  Facet degenerative changes: Mild multilevel facet degenerative changes are seen.  Fractures: No acute fracture dislocation or subluxation is seen.      Impression:  1. No acute fracture dislocation or subluxation is seen.  2. Degenerative changes and other details as above.                                 CT Head Without Contrast (Final result)  Result time 05/22/22 17:01:07    Final result by Daryl Sewell MD (05/22/22 17:01:07)                 Impression:      1.  Fractures with depressions involving the outer table of the left frontal sinus with left frontal sinus hyperdense hemorrhagic fluid.    2.  Minimal focal fracture inner table of the left frontal sinus without pneumocephalus.    3.  Artifact versus punctate hyperdense hemorrhage right frontal lobe.    Findings were notified to Dr. Summers May 22, 2022 at 17:00 hours.      Electronically signed by: Daryl Sewell  Date:    05/22/2022  Time:    17:01             Narrative:    EXAMINATION:  CT HEAD WITHOUT CONTRAST    CLINICAL HISTORY:  Trauma;    TECHNIQUE:  Sequential axial images were performed of the brain without contrast.    Dose product length of 2562 mGycm. Automated exposure control was utilized to minimize radiation dose.    COMPARISON:  None available.    FINDINGS:  There are fractures with depressions which involve the outer table of the left frontal sinus and the left frontal sinus is occupied by hyperdense fluid.  There is also minimal focal fracture defect of the  inner table of the left neural sinus which is seen on image 23 series 7.  There is no apparent pneumocephalus.    There is an artifact versus punctate hyperdense hemorrhage right frontal lobe on image 23 series 5.  There is no intracranial mass effect, midline shift or hydrocephalus. There is no sulcal effacement or low attenuation changes to suggest recent large vessel territory infarction. Chronic appearing periventricular and subcortical white matter low attenuation changes are present and are consistent with chronic microangiopathic ischemia. The ventricular system and sulcal markings prominence is consistent with atrophy. There is no acute extra axial fluid collection.                               X-Ray Chest 1 View (Final result)  Result time 05/22/22 16:26:35    Final result by Daryl Sewell MD (05/22/22 16:26:35)                 Impression:      As above.      Electronically signed by: Daryl Sewell  Date:    05/22/2022  Time:    16:26             Narrative:    EXAMINATION:  XR CHEST 1 VIEW    CLINICAL HISTORY:  r/o bleeding or hemorrhage;    TECHNIQUE:  One view    COMPARISON:  None available.    FINDINGS:  Cardiopericardial silhouette is within normal limits.  Lungs hyperinflation accentuates bronchovascular appearance without exclusion of mild congestive process.  There is no focally dense consolidation, fluid within the pleural space or pneumothorax.                              A/P:  Assessment:  1. Head trauma by fall from ground level   A. L frontal sinus fracture, open with L hemosinus   B. Punctate hemorrhages of R frontal lobe  2. Altered mental status  3. Hx of PE in 2014 s/p possible lobectomy, records missing  4. Acute DVT of R posterior tibial vein    Plan:  1. Admit to ICU for q1hr neuro checks, blood pressure control for likely R frontal lobe punctate hemorrhage  2. Will avoid anticoagulation for now until given clearance by neurosurgery.  Pt has a history of provoked PE as well as now  acute DVT of R posterior tibial vein.  Stable on 2L NC, hemodynamically stable, will perform CT Angio in the morning to assess for PE  3. Given open frontal sinus fracture, will start on clindamycin 600mg TID for prophylaxis.  Additional management per primary.  4. Will keep SBP <140 for now.  PRN hydralazine in place.    Sinan Nagel MD  5/22/2022  Pulmonology/Critical Care

## 2022-05-23 NOTE — CONSULTS
"History           Chief Complaint   Patient presents with    Fall       Ground level fall at home, recent back surgery, ems reports initial gcs 14 and declined to 11 in route. Patient disoriented to time, place, and situation. Inappropriate answers. Trauma level 2.       62 yo CF with history of DM presents to ED following unwitnessed fall today after she was discharged from [facility] following back surgery.  EMS reports that pt's  left her alone for about 15 minutes, then found her lying unresponsive on the floor when he returned.  They say that pt was initial GCS of 14 and that she complained of a HA.  Her GCS and O2 saturation declined while en route.  Pt is not on thinners and her CBG was 89.  She now complains of nausea and says she does not remember what happened.     Spoke with , Gerald. He reports patient had lumbar surgery to "have part of bone shaved off" 2 days ago and was discharged this afternoon. He says patient was a little "woozy" because of the meds she had been getting. When they arrived home, he put her on the couch and went to pharmacy to get her meds filled. He called her at the pharmacy and told her he would be 10-15 minutes and she was still inside at that time. When he drove up at the house, he was laying outside on the driveway.     The history is provided by the patient and the EMS personnel. The history is limited by the condition of the patient.   Fall  The accident occurred just prior to arrival. Distance fallen: ground level. The point of impact was the head. The pain is present in the head. Associated symptoms include nausea and headaches. Pertinent negatives include no fever, no numbness, no vomiting and no hematuria. Treatment on scene includes a c-collar.      Review of patient's allergies indicates:  No Known Allergies       Past Medical History:   Diagnosis Date    Diabetes mellitus      Hypertension              Past Surgical History:   Procedure " Laterality Date    LAMINECTOMY AND MICRODISCECTOMY LUMBAR SPINE          No family history on file.  Review of Systems   Constitutional: Negative for chills, diaphoresis and fever.   HENT: Negative for congestion and sore throat.    Eyes: Negative for visual disturbance.   Respiratory: Negative for cough and shortness of breath.    Cardiovascular: Negative for chest pain and palpitations.   Gastrointestinal: Positive for nausea. Negative for diarrhea and vomiting.   Genitourinary: Negative for dysuria and hematuria.   Skin: Negative for rash.   Neurological: Positive for headaches. Negative for syncope, weakness and numbness.   All other systems reviewed and are negative.        Physical Exam             Initial Vitals [05/22/22 1607]   BP Pulse Resp Temp SpO2   (!) 127/53 74 20 98.4 °F (36.9 °C) 96 %       MAP           --              Physical Exam     Nursing note and vitals reviewed.  Constitutional: She appears well-developed and well-nourished. She is not diaphoretic. She does not appear ill. No distress.   HENT:   Head: Normocephalic.   Right Ear: External ear normal.   Left Ear: External ear normal.   Mouth/Throat: Oropharynx is clear and moist.   Hematoma to L forehead; dry mucous membranes   Eyes: Conjunctivae and EOM are normal. Pupils are equal, round, and reactive to light.   Pupils 3-2 mm bilaterally     Neck: No tracheal deviation present.   In cervical collar   Cardiovascular: Normal rate, regular rhythm, normal heart sounds and intact distal pulses.   No murmur heard.  2+ bilateral radial and dorsalis pedis pulses   Pulmonary/Chest: Breath sounds normal. No respiratory distress. She has no wheezes. She has no rhonchi. She has no rales.   Abdominal: Abdomen is soft. Bowel sounds are normal. She exhibits no distension. There is no abdominal tenderness.   No right CVA tenderness.  No left CVA tenderness.   Musculoskeletal:         General: Normal range of motion.     Neurological: She is alert. She  has normal strength. No cranial nerve deficit or sensory deficit. GCS eye subscore is 3. GCS verbal subscore is 4. GCS motor subscore is 6.   Equal strength in upper and lower extremities; no facial droop; ; oriented to place and person but not time   Skin: Skin is warm and dry. Capillary refill takes less than 2 seconds. No rash noted. No pallor.   Abrasion to posterior aspect of L shoulder; abrasions to L arm; surgical incision to lumbar area (clean, dry, intact with appropriate tenderness)   Psychiatric: She has a normal mood and affect. Her behavior is normal.            ED Course               Labs Reviewed   COMPREHENSIVE METABOLIC PANEL - Abnormal; Notable for the following components:       Result Value      Potassium Level 3.1 (*)       Carbon Dioxide 32 (*)       Blood Urea Nitrogen 9.4 (*)       Calcium Level Total 8.0 (*)       Protein Total 5.4 (*)       Albumin Level 3.1 (*)       Globulin 2.3 (*)       All other components within normal limits   CBC WITH DIFFERENTIAL - Abnormal; Notable for the following components:     WBC 11.8 (*)       RBC 3.47 (*)       Hgb 11.6 (*)       Hct 34.6 (*)       MCV 99.7 (*)       MCH 33.4 (*)       IG# 0.05 (*)       All other components within normal limits   PROTIME-INR - Normal   APTT - Normal   LACTIC ACID, PLASMA - Normal   ALCOHOL,MEDICAL (ETHANOL) - Normal   LACTIC ACID, PLASMA - Normal   CBC W/ AUTO DIFFERENTIAL     Narrative:      The following orders were created for panel order CBC auto differential.  Procedure                               Abnormality         Status                     ---------                               -----------         ------                     CBC with Differential[[id number]]        Abnormal            Final result                  Please view results for these tests on the individual orders.   URINALYSIS, REFLEX TO URINE CULTURE   DRUG SCREEN, URINE (BEAKER)   TYPE & SCREEN             Imaging Results            CT  Maxillofacial Without Contrast (In process)                   CT Chest Without Contrast (In process)                          CT Thoracic Spine Without Contrast (Preliminary result)  Result time 05/22/22 17:08:13                Preliminary result by Kurt Nina MD (05/22/22 17:08:13)                               Narrative:     START OF REPORT:  Technique: CT of the thoracic spine without contrast with direct axial as well as sagittal and coronal reconstruction images.     Comparison: None.     Dosage Information: Automated Exposure Control was utilized.     Clinical History: Had back sx x 2 days ago; found down by  in driveway.     Findings:  Mineralization of the bony structures: Within normal limits for age.  Bone marrow:  Curvature: Normal thoracic kyphosis.  Fractures: No fracture dislocation or subluxation is identified.  Degenerative changes: Subtle scattered thoracic spine spondylosis is seen.  Intervertebral disc spaces: Preserved throughout.  Osteophytes: Mild anterior osteophytes are seen.  Endplates: No significant endplate sclerosis.  Facet degenerative changes: Mild bilateral multilevel facet degenerative changes are seen.  Spinal canal: Unremarkable.     Miscellaneous: Tiny calcifications are noted in the left thyroid lobe. Correlate clinically and with other laboratory findings. Streaky and linear opacities are seen in the visualized lungs likely due to nonspecific dependent changes. There are also areas of dense subpleural opacity along the right lateral chest as well as the right lung apex which likely reflect areas of atelectasis or scarring with underlying nodular components not excluded.        Impression:  1. Tiny calcifications are noted in the left thyroid lobe. Correlate clinically and with other laboratory findings. Streaky and linear opacities are seen in the visualized lungs likely due to nonspecific dependent changes. There are also areas of dense subpleural opacity along the  right lateral chest as well as the right lung apex which likely reflect areas of atelectasis or scarring with underlying nodular components not excluded. Correlate with clinical and laboratory findings as regards additional evaluation and follow up.  2. No fracture dislocation or subluxation is seen. Degenerative changes as above. Details and other findings as above.                                                     CT Lumbar Spine Without Contrast (Preliminary result)  Result time 05/22/22 17:06:36                Preliminary result by Kurt Nina MD (05/22/22 17:06:36)                               Narrative:     START OF REPORT:  Technique: CT of the lumbar spine was performed without intravenous contrast with direct axial as well as sagittal and coronal reconstruction images.     Comparison: None.     Clinical history: None.     Findings:  Mineralization: The bony mineralization is within normal limits for age.  Bone alignment: Unremarkable with no significant listhesis.  Bone and bone marrow: Vertebral body heights are maintained.  Intervertebral disc spaces: There is vacuum disc phenomenon at L4-L5 with the rest of the disc heights appearing preserved.  Osteophytes: Multilevel anterior marginal endplate osteophytes are noted.  Endplate Sclerosis: No significant endplate sclerosis is seen.  Spinal canal: Unremarkable with no bony spinal canal stenosis identified.  Findings at specific levels:  L4- 5: There has been right-sided hemilaminectomy with some gas in thehemi laminectomy surgical bed which likely is postoperative. There is a moderate disc bulge at the L4-L5 level with vacuum disc phenomenon at this level. This disc bulge causes narrowing of the right-sided neural foramina as well as likely displacing the bilateral transiting nerve roots at this level. No specific epidural hemorrhage is identified at this level.        Impression:  1. There has been right-sided hemilaminectomy with some gas in  thehemi laminectomy surgical bed which likely is postoperative. There is a moderate disc bulge at the L4-L5 level with vacuum disc phenomenon at this level. This disc bulge causes narrowing of the right-sided neural foramina as well as likely displacing the bilateral transiting nerve roots at this level. No specific epidural hemorrhage is identified at this level.  2. Degenerative and postsurgical changes as above. No fracture or dislocation or subluxation is identified.                                                     CT Cervical Spine Without Contrast (Preliminary result)  Result time 05/22/22 17:05:46          Preliminary result by Kurt Nina MD (05/22/22 17:05:46)                               Narrative:     START OF REPORT:  Technique: CT of the cervical spine was performed without intravenous contrast with axial as well as sagittal and coronal images.     Comparison: None.     Dosage Information: Automated exposure control was utilized.     Clinical history: Had back sx x 2 days ago; found down by  in driveway.     Findings:  Lung apices: Lung findings discussed on thoracic spine CT.  Spine:  Spinal canal: The spinal canal appears unremarkable.  Mineralization: Within normal limits for age.  Rotation: No significant rotation is seen.  Scoliosis: No significant scoliosis is seen.  Vertebral Fusion: No vertebral fusion is identified.  Listhesis: No significant listhesis is identified.  Lordosis: The cervical lordosis is maintained.  Intervertebral disc spaces: The intervertebral discs are preserved throughout.  Osteophytes: Pronounced multilevel endplate osteophytes are seen.  Endplate Sclerosis: No significant endplate sclerosis is seen.  Uncovertebral degenerative changes: Mild multilevel uncovertebral joint arthrosis is seen.  Facet degenerative changes: Mild multilevel facet degenerative changes are seen.  Fractures: No acute fracture dislocation or subluxation is seen.        Impression:  1.  No acute fracture dislocation or subluxation is seen.  2. Degenerative changes and other details as above.                                                     CT Head Without Contrast (Final result)  Result time 05/22/22 17:01:07                Final result by Daryl Sewell MD (05/22/22 17:01:07)                         Impression:        1.  Fractures with depressions involving the outer table of the left frontal sinus with left frontal sinus hyperdense hemorrhagic fluid.     2.  Minimal focal fracture inner table of the left frontal sinus without pneumocephalus.     3.  Artifact versus punctate hyperdense hemorrhage right frontal lobe.     Findings were notified to Dr. Summers May 22, 2022 at 17:00 hours.        Electronically signed by:       Daryl Sewell  Date:                                                05/22/2022  Time:                                                17:01                         Narrative:     EXAMINATION:  CT HEAD WITHOUT CONTRAST     CLINICAL HISTORY:  Trauma;     TECHNIQUE:  Sequential axial images were performed of the brain without contrast.     Dose product length of 2562 mGycm. Automated exposure control was utilized to minimize radiation dose.     COMPARISON:  None available.     FINDINGS:  There are fractures with depressions which involve the outer table of the left frontal sinus and the left frontal sinus is occupied by hyperdense fluid.  There is also minimal focal fracture defect of the inner table of the left neural sinus which is seen on image 23 series 7.  There is no apparent pneumocephalus.     There is an artifact versus punctate hyperdense hemorrhage right frontal lobe on image 23 series 5.  There is no intracranial mass effect, midline shift or hydrocephalus. There is no sulcal effacement or low attenuation changes to suggest recent large vessel territory infarction. Chronic appearing periventricular and subcortical white matter low attenuation changes are present and  are consistent with chronic microangiopathic ischemia. The ventricular system and sulcal markings prominence is consistent with atrophy. There is no acute extra axial fluid collection.                                                  X-Ray Chest 1 View (Final result)  Result time 05/22/22 16:26:35                Final result by Daryl Sewell MD (05/22/22 16:26:35)                               Impression:        As above.        Electronically signed by:       Daryl Sewell  Date:                                                05/22/2022  Time:                                                16:26                         Narrative:     EXAMINATION:  XR CHEST 1 VIEW     CLINICAL HISTORY:  r/o bleeding or hemorrhage;     TECHNIQUE:  One view     COMPARISON:  None available.     FINDINGS:  Cardiopericardial silhouette is within normal limits.  Lungs hyperinflation accentuates bronchovascular appearance without exclusion of mild congestive process.  There is no focally dense consolidation, fluid within the pleural space or pneumothorax.                                          X-Rays:   Independently Interpreted Readings:   Chest X-Ray: Normal heart size.  No acute abnormalities.      Medications   lactated ringers bolus 1,000 mL (1,000 mLs Intravenous Not Given 5/22/22 1630)   ondansetron injection 4 mg (4 mg Intravenous Not Given 5/22/22 1630)   levETIRAcetam injection 500 mg (500 mg Intravenous Given 5/22/22 1813)   sodium chloride 0.9% flush 10 mL (has no administration in time range)   0.9%  NaCl infusion (has no administration in time range)   lactated ringers infusion ( Intravenous Stopped 5/22/22 1853)   ondansetron injection ( Intramuscular Not Given 5/22/22 1615)   ondansetron injection 4 mg (4 mg Intravenous Given 5/22/22 1733)      Medical Decision Making:   History:   I obtained history from: someone other than patient and EMS provider.       <> Summary of History: 60 yo female with AMS after fall  discharged from surgical center today after lumbar surgery   Initial Assessment:   Level II Trauma   ABCs intact, alerted with GCS 13, vitals stable     Differential Diagnosis:   Intracranial hemorrhage, stroke, closed head injury, metabolic encephalopathy, spine injury  Independently Interpreted Test(s):   I have ordered and independently interpreted X-rays - see prior notes.  Clinical Tests:   Lab Tests: Ordered and Reviewed  Radiological Study: Ordered and Reviewed  ED Management:  CXR clear   CTH with possible punctate hemorrhage, frontal sinus fx  NSGY consulted  Given Keppra   Admitted to trauma service   Other:   I have discussed this case with another health care provider.       <> Summary of the Discussion: NSGY for consultation   Trauma surgery for admission            Assement: non displaced frontal bone fracture    Patient's facial fractures are non displaced and non-operative  These will heal without surgical intervention  Recommend elevation HOB to decrease swelling and minimize nose blowing   Follow up is not needed unless problems should arise  I appreciate the opportunity for this consult   PRS will sign off

## 2022-05-23 NOTE — CONSULTS
"[facility] - 7th Floor ICU  Neurosurgery  Consult Note    Inpatient consult to Neurosurgery  Consult performed by: SAJI Hampton  Consult ordered by: Buddy Hermosillo MD  Reason for consult: Possible ICH        Subjective:     Chief Complaint/Reason for Admission: Head trauma s/p fall    History of Present Illness: Patient is a 60 yo female with history of DM presented to the ED yesterday following unwitnessed fall after she was discharged from [facility] following back surgery with Dr. Stark.  EMS reports that pt's  left her alone for about 15 minutes, then found her lying unresponsive on the floor when he returned.  They say that pt was initial GCS of 14 and that she complained of a HA.  Her GCS and O2 saturation declined while en route.  Pt is not on thinners and her CBG was 89.  She now complains of nausea and says she does not remember what happened.     Spoke with , Gerald. He reports patient had lumbar surgery to "have part of bone shaved off" 2 days ago and was discharged this afternoon. He says patient was a little "woozy" because of the meds she had been getting. When they arrived home, he put her on the couch and went to pharmacy to get her meds filled. He called her at the pharmacy and told her he would be 10-15 minutes and she was still inside at that time. When he drove up at the house, she was laying outside on the driveway.    Upon arrival patient did have a CT head completed which showed a possible ICH in the right frontal lobe vs artifact. Dr. Burris was consulted for evaluation and treatment recommendations.  On PE today she is lying in bed, NAD. She does appear drowsy. She denies HA, blurred vision and N/V. She does not recall the events surrounding her fall. She denies new back pain. She denies LE complaints.     PTA Medications   Medication Sig    ALPRAZolam (XANAX) 0.25 MG tablet Take 0.25 mg by mouth 2 (two) times a day.    " atenoloL (TENORMIN) 50 MG tablet Take 50 mg by mouth once daily.    atorvastatin (LIPITOR) 20 MG tablet Take 20 mg by mouth once daily.    digoxin (LANOXIN) 250 mcg tablet Take 250 mcg by mouth once daily.    EScitalopram oxalate (LEXAPRO) 20 MG tablet Take 20 mg by mouth once daily.    furosemide (LASIX) 40 MG tablet Take 40 mg by mouth once daily.    glimepiride (AMARYL) 2 MG tablet Take 2 mg by mouth before breakfast.    metFORMIN (GLUCOPHAGE) 500 MG tablet Take 1,000 mg by mouth once daily.    omeprazole (PRILOSEC) 40 MG capsule Take 40 mg by mouth once daily.    potassium chloride (MICRO-K) 10 MEQ CpSR Take 10 mEq by mouth 2 (two) times daily.    valACYclovir (VALTREX) 500 MG tablet Take 500 mg by mouth 2 (two) times daily.       Review of patient's allergies indicates:  No Known Allergies    Past Medical History:   Diagnosis Date    Diabetes mellitus      Past Surgical History:   Procedure Laterality Date    LAMINECTOMY AND MICRODISCECTOMY LUMBAR SPINE       Family History    None       Tobacco Use    Smoking status: Current Every Day Smoker     Types: Cigarettes    Smokeless tobacco: Current User   Substance and Sexual Activity    Alcohol use: Never    Drug use: Not on file    Sexual activity: Not on file     Review of Systems  Objective:     12 pt ROS WNL, except for HPI    Weight: 90.7 kg (200 lb)  Body mass index is 32.28 kg/m².  Vital Signs (Most Recent):  Temp: 98.1 °F (36.7 °C) (05/23/22 0800)  Pulse: 60 (05/23/22 0945)  Resp: (!) 23 (05/23/22 0945)  BP: (!) 109/46 (05/23/22 0945)  SpO2: 95 % (05/23/22 0945) Vital Signs (24h Range):  Temp:  [98.1 °F (36.7 °C)-99.3 °F (37.4 °C)] 98.1 °F (36.7 °C)  Pulse:  [55-89] 60  Resp:  [3-26] 23  SpO2:  [90 %-100 %] 95 %  BP: ()/(46-57) 109/46     Physical Exam:    Constitutional: She appears well-developed and well-nourished.     Eyes: Pupils are equal, round, and reactive to light. Conjunctivae and EOM are normal.     Cardiovascular: Normal  rate and regular rhythm.     Abdominal: Soft. Bowel sounds are normal.     Psych/Behavior: She is alert. She is oriented to person, place, and time. She has a normal mood and affect.     Musculoskeletal:        Neck: Range of motion is full.        Back: Range of motion is full.        Right Upper Extremities: Range of motion is full. Muscle strength is 5/5.        Left Upper Extremities: Range of motion is full. Muscle strength is 5/5.       Right Lower Extremities: Range of motion is full. Muscle strength is 5/5.        Left Lower Extremities: Range of motion is full. Muscle strength is 5/5.     Neurological:        Cranial nerves: Cranial nerve(s) II, III, IV, V, VI, VII, VIII, IX, X, XI and XII are intact.       Significant Labs:  Recent Labs   Lab 05/22/22 1622 05/23/22  0238    140   K 3.1* 3.4*   CO2 32* 29   BUN 9.4* 7.1*   CREATININE 0.80 0.70   CALCIUM 8.0* 8.0*   MG  --  1.20*     Recent Labs   Lab 05/22/22 1622 05/23/22  0238   WBC 11.8* 10.1   HGB 11.6* 10.9*   HCT 34.6* 33.0*    184     Recent Labs   Lab 05/22/22 1622   INR 1.12     Microbiology Results (last 7 days)     ** No results found for the last 168 hours. **          Significant Diagnostics:  CT: CT Head Without Contrast    Result Date: 5/23/2022  Impression: 1. Again noted is a comminuted minimally displaced fracture involving the anterior wall of the left frontal sinus (series 4, images 16-18) with associated hemosinus. 2.  No intracranial hemorrhage identified. No significant discrepancy with overnight report Electronically signed by: Daryl Sewell Date:    05/23/2022 Time:    07:30    CT Head Without Contrast    Result Date: 5/22/2022  1.  Fractures with depressions involving the outer table of the left frontal sinus with left frontal sinus hyperdense hemorrhagic fluid. 2.  Minimal focal fracture inner table of the left frontal sinus without pneumocephalus. 3.  Artifact versus punctate hyperdense hemorrhage right frontal  lobe. Findings were notified to Dr. Summers May 22, 2022 at 17:00 hours. Electronically signed by: Daryl Sewell Date:    05/22/2022 Time:    17:01    Assessment/Plan:     Active Diagnoses:    Diagnosis Date Noted POA    Fall as cause of accidental injury at home as place of occurrence [W19.XXXA, Y92.009] 05/22/2022 Not Applicable      Problems Resolved During this Admission:     She is neuro intact on exam. She denies HA.  Repeat CT head showed no acute ICH.  Plastics has been consulted for frontal sinus fx.  No neurosurgical interventions planned.  She will f/u with Dr. Stark for post op appt  We will sign off.    Thank you for your consult.     SAJI Hampton  Neurosurgery  [facility] - 7th Floor ICU

## 2022-05-23 NOTE — H&P
Attestation signed by Isauro Nice MD at 5/23/2022  8:06 AM     I have seen the patient, reviewed the   Resident's H&P    I have personally interviewed and examined the patient at bedside and:   agree with the findings. Unwitnessed fall after recent back surgery and discharge from hospital ([facility]).  Confused.  GCS 14. Disoriented.  Complains of L anterior chest pain with coughing.  Patient is a smoker.  Rib fractures noted on CT chest.  Awaiting final report.  Had a history of a PE after a gynecologic surgery in the past.  A lot of risk factors for VTE.  Will get ultrasound.  Can't have lovenox yet due to head bleed.  FACE on board for frontal sinus fracture.  Will get max face too.  Admit to ICU  NSG on board.     **Patient was seen at 5/22 at 6:00PM**        [facility] Surgery/General Surgery  History and Physical    SUBJECTIVE:     Chief Complaint:   Per triage note--Fall (Ground level fall at home, recent back surgery, ems reports initial gcs 14 and declined to 11 in route. Patient disoriented to time, place, and situation. Inappropriate answers. Trauma level 2. )      History of Present Illness:  Ms. Morales is a 61 y.o. female with history of diabetes, CHF, anxiety, and pulmonary embolism presents as a level 2 trauma status post fall.  She had back surgery 2 days ago, L4-L5 laminectomy, and was discharged home yesterday afternoon.  She was standing in the driveway when her  left her alone for approximately 15 minutes when he returned he found her laying on the ground.  She was found to be confused compared to baseline with a left frontal sinus fracture, and punctate hemorrhage in the right frontal lobe, also with questionable left-sided anterior rib fractures.  She has a GCS 14 and was seen by Neurosurgery who is recommending ICU admission and repeat CT head in the morning.  ENT was consulted for facial fractures.  She is not currently on anticoagulation for her history of  clots.    Allergies:  Review of patient's allergies indicates:  No Known Allergies    Home Medications:  No current facility-administered medications on file prior to encounter.     Current Outpatient Medications on File Prior to Encounter   Medication Sig    ALPRAZolam (XANAX) 0.25 MG tablet Take 0.25 mg by mouth 2 (two) times a day.    atenoloL (TENORMIN) 50 MG tablet Take 50 mg by mouth once daily.    atorvastatin (LIPITOR) 20 MG tablet Take 20 mg by mouth once daily.    digoxin (LANOXIN) 250 mcg tablet Take 250 mcg by mouth once daily.    EScitalopram oxalate (LEXAPRO) 20 MG tablet Take 20 mg by mouth once daily.    furosemide (LASIX) 40 MG tablet Take 40 mg by mouth once daily.    glimepiride (AMARYL) 2 MG tablet Take 2 mg by mouth before breakfast.    metFORMIN (GLUCOPHAGE) 500 MG tablet Take 1,000 mg by mouth once daily.    omeprazole (PRILOSEC) 40 MG capsule Take 40 mg by mouth once daily.    potassium chloride (MICRO-K) 10 MEQ CpSR Take 10 mEq by mouth 2 (two) times daily.    valACYclovir (VALTREX) 500 MG tablet Take 500 mg by mouth 2 (two) times daily.       Past Medical History:   Diagnosis Date    Diabetes mellitus      Past Surgical History:   Procedure Laterality Date    LAMINECTOMY AND MICRODISCECTOMY LUMBAR SPINE       No family history on file.  Social History     Tobacco Use    Smoking status: Current Every Day Smoker     Types: Cigarettes    Smokeless tobacco: Current User   Substance Use Topics    Alcohol use: Never        Review of Systems:  All 10 ROS negative except that which is indicated in the HPI    OBJECTIVE:     Vital Signs:  Temp: 99.3 °F (37.4 °C) (05/22/22 2200)  Pulse: (!) 55 (05/22/22 2330)  Resp: (!) 21 (05/23/22 0103)  BP: (!) 115/57 (05/22/22 2330)  SpO2: 97 % (05/22/22 2330)    Physical Exam:  General: well developed, well nourished, no distress  HEENT:  Scattered abrasions to face, EOMI  Neck:  Remains in C-collar  Lungs:  Satting well on 3 L nasal  cannula  Cardiovascular: regular rate, reporting left-sided chest pain, worse with palpation  Abdomen: soft, nondistended, nontender to palpation  Musculoskeletal:no clubbing, cyanosis, no deformities, laminectomy incision clean dry intact without any surrounding erythema or exudates  Neurologic:  GCS 14, motor and sensation intact, A&Ox2    Laboratory:  Labs Reviewed   COMPREHENSIVE METABOLIC PANEL - Abnormal; Notable for the following components:       Result Value    Potassium Level 3.1 (*)     Carbon Dioxide 32 (*)     Blood Urea Nitrogen 9.4 (*)     Calcium Level Total 8.0 (*)     Protein Total 5.4 (*)     Albumin Level 3.1 (*)     Globulin 2.3 (*)     All other components within normal limits   CBC WITH DIFFERENTIAL - Abnormal; Notable for the following components:    WBC 11.8 (*)     RBC 3.47 (*)     Hgb 11.6 (*)     Hct 34.6 (*)     MCV 99.7 (*)     MCH 33.4 (*)     IG# 0.05 (*)     All other components within normal limits   PROTIME-INR - Normal   APTT - Normal   LACTIC ACID, PLASMA - Normal   ALCOHOL,MEDICAL (ETHANOL) - Normal   LACTIC ACID, PLASMA - Normal   CBC W/ AUTO DIFFERENTIAL    Narrative:     The following orders were created for panel order CBC auto differential.                  Procedure                               Abnormality         Status                                     ---------                               -----------         ------                                     CBC with Differential[[id number]]        Abnormal            Final result                                                 Please view results for these tests on the individual orders.   URINALYSIS, REFLEX TO URINE CULTURE   DRUG SCREEN, URINE (BEAKER)   TYPE & SCREEN         Diagnostic Results:  Imaging Results          CT Maxillofacial Without Contrast (Preliminary result)  Result time 05/22/22 21:46:50    Preliminary result by Kurt Nina MD (05/22/22 21:46:50)                 Narrative:    START OF  REPORT:  Technique: Noncontrast maxillofacial CT was performed with axial as well as sagittal and coronal images being submitted for interpretation.    Comparison: None.    Clinical history: Fall- bruise lt face, lt chest pain.    Findings:  Facial soft tissues:  Contusion: Mild scalp soft tissue swelling is seen to the left of frontal bone. The swelling is centered on âImage 59, Series 3â. This consistent with a scalp contusion.  Orbital soft tissues: The orbital soft tissues appear unremarkable.  Bones:  Orbital bony structures: The bilateral orbital bony structures are intact with no orbital fracture identified.  Mandible: The mandible appears unremarkable with no fracture identified.  Maxilla: The maxilla appears unremarkable with no fracture identified.  Pterygoid plates: No fracture identified of the right or left pterygoid plates.  Zygoma: The zygomatic arches are intact.  TMJ: The mandibular condyles appear normally placed with respect to the mandibular fossa.  Nasal Bones: No displaced nasal bone fracture is seen. Mild leftward deviation of the nasal septum is seen.  Skull: No acute linear or depressed fracture is identified in the visualized skull except for the fractures involving left frontal sinus.  Paranasal sinuses: There are comminuted mildly depressed fractures involving the inner and outer table cortices of left frontal sinus consistent with an open fracture. There is associated hemosinus with fluid level in left frontal sinus and left frontal sinus recess. The rest of the paranasal sinuses appear clear.  Mastoid air cells: The visualized mastoid air cells appear clear.  Brain: The visualized intracranial structures appear unremarkable.    Notification: The results were discussed prior to dictation with the patient's nurse (Rosa) at 2022-05-22 22:41:40 CDT.      Impression:  1. Mild scalp soft tissue swelling is seen to the left of frontal bone. The swelling is centered on âImage 59,  Series 3â. This consistent with a scalp contusion.  2. There are comminuted mildly depressed fractures involving the inner and outer table cortices of left frontal sinus consistent with an open fracture. There is associated hemosinus with fluid level in left frontal sinus and left frontal sinus recess. Correlate clinically as regard to additional evaluation and follow up.                                 CT Chest Without Contrast (Preliminary result)  Result time 05/22/22 21:45:46    Preliminary result by Kurt Nina MD (05/22/22 21:45:46)                 Narrative:    START OF REPORT:  Technique: CT Scan of the chest was performed without intravenous contrast with direct axial as well as sagittal and, coronal, reconstruction images.    Dosage Information: Automated Exposure Control was utilized.    Comparison: None.    Clinical History: Fall- bruise lt face, lt chest pain-cough.    Findings:  Neck: The few calcifications and suspicious nodules are seen in thyroid gland which otherwise appear unremarkable.  Mediastinum: The mediastinal structures are within normal limits.  Heart: The heart size is within normal limits. Coronary artery calcification is seen.  Aorta: Mild aortic calcification is seen in the thoracic aorta.  Pulmonary Arteries: Unremarkable.  Lungs: Scattered streaky linear opacities either due to scarring or subsegmental atelectasis predominantly in the dependent portions at the lung bases consistent with scarring and subsegmental atelectasis. There are bilateral areas of subpleural nodular thickening particularly at the bilateral lung apices in the right lower lobe an image 71 series 4 with some associated calcification. Additionally there is an irregular 9 x 6 mm nodular density at the right upper lung centered on image 42 series 4.  Pleura: There is a trace right sided pleural effusion. There is a small left sided pleural effusion.  Bony Structures:  Spine: Spondylolytic changes are seen in  the thoracic spine.  Ribs: No rib fractures are identified.  Abdomen: The visualized upper abdominal organs appear unremarkable.      Impression:  1. Scattered streaky linear opacities either due to scarring or subsegmental atelectasis predominantly in the dependent portions at the lung bases consistent with scarring and subsegmental atelectasis. There are bilateral areas of subpleural nodular thickening particularly at the bilateral lung apices in the right lower lobe an image 71 series 4 with some associated calcification. Additionally there is an irregular 9 x 6 mm nodular density at the right upper lung centered on image 42 series 4. While these may reflect scarring and atelectasis the possibility of an underlying nodular or even neoplastic process is not excluded. Correlate with clinical and laboratory findings as regards additional evaluation and follow up. Comparison with prior studiesis recommended if and when made available.  2. There is a trace right sided pleural effusion. There is a small left sided pleural effusion.  3. Details and other findings as discussed above.                                 CT Thoracic Spine Without Contrast (Preliminary result)  Result time 05/22/22 17:08:13    Preliminary result by Kurt Nina MD (05/22/22 17:08:13)                 Narrative:    START OF REPORT:  Technique: CT of the thoracic spine without contrast with direct axial as well as sagittal and coronal reconstruction images.    Comparison: None.    Dosage Information: Automated Exposure Control was utilized.    Clinical History: Had back sx x 2 days ago; found down by  in driveway.    Findings:  Mineralization of the bony structures: Within normal limits for age.  Bone marrow:  Curvature: Normal thoracic kyphosis.  Fractures: No fracture dislocation or subluxation is identified.  Degenerative changes: Subtle scattered thoracic spine spondylosis is seen.  Intervertebral disc spaces: Preserved  throughout.  Osteophytes: Mild anterior osteophytes are seen.  Endplates: No significant endplate sclerosis.  Facet degenerative changes: Mild bilateral multilevel facet degenerative changes are seen.  Spinal canal: Unremarkable.    Miscellaneous: Tiny calcifications are noted in the left thyroid lobe. Correlate clinically and with other laboratory findings. Streaky and linear opacities are seen in the visualized lungs likely due to nonspecific dependent changes. There are also areas of dense subpleural opacity along the right lateral chest as well as the right lung apex which likely reflect areas of atelectasis or scarring with underlying nodular components not excluded.      Impression:  1. Tiny calcifications are noted in the left thyroid lobe. Correlate clinically and with other laboratory findings. Streaky and linear opacities are seen in the visualized lungs likely due to nonspecific dependent changes. There are also areas of dense subpleural opacity along the right lateral chest as well as the right lung apex which likely reflect areas of atelectasis or scarring with underlying nodular components not excluded. Correlate with clinical and laboratory findings as regards additional evaluation and follow up.  2. No fracture dislocation or subluxation is seen. Degenerative changes as above. Details and other findings as above.                                 CT Lumbar Spine Without Contrast (Preliminary result)  Result time 05/22/22 17:06:36    Preliminary result by Kurt Nina MD (05/22/22 17:06:36)                 Narrative:    START OF REPORT:  Technique: CT of the lumbar spine was performed without intravenous contrast with direct axial as well as sagittal and coronal reconstruction images.    Comparison: None.    Clinical history: None.    Findings:  Mineralization: The bony mineralization is within normal limits for age.  Bone alignment: Unremarkable with no significant listhesis.  Bone and bone marrow:  Vertebral body heights are maintained.  Intervertebral disc spaces: There is vacuum disc phenomenon at L4-L5 with the rest of the disc heights appearing preserved.  Osteophytes: Multilevel anterior marginal endplate osteophytes are noted.  Endplate Sclerosis: No significant endplate sclerosis is seen.  Spinal canal: Unremarkable with no bony spinal canal stenosis identified.  Findings at specific levels:  L4- 5: There has been right-sided hemilaminectomy with some gas in thehemi laminectomy surgical bed which likely is postoperative. There is a moderate disc bulge at the L4-L5 level with vacuum disc phenomenon at this level. This disc bulge causes narrowing of the right-sided neural foramina as well as likely displacing the bilateral transiting nerve roots at this level. No specific epidural hemorrhage is identified at this level.      Impression:  1. There has been right-sided hemilaminectomy with some gas in thehemi laminectomy surgical bed which likely is postoperative. There is a moderate disc bulge at the L4-L5 level with vacuum disc phenomenon at this level. This disc bulge causes narrowing of the right-sided neural foramina as well as likely displacing the bilateral transiting nerve roots at this level. No specific epidural hemorrhage is identified at this level.  2. Degenerative and postsurgical changes as above. No fracture or dislocation or subluxation is identified.                                 CT Cervical Spine Without Contrast (Preliminary result)  Result time 05/22/22 17:05:46    Preliminary result by Kurt Nina MD (05/22/22 17:05:46)                 Narrative:    START OF REPORT:  Technique: CT of the cervical spine was performed without intravenous contrast with axial as well as sagittal and coronal images.    Comparison: None.    Dosage Information: Automated exposure control was utilized.    Clinical history: Had back sx x 2 days ago; found down by  in driveway.    Findings:  Lung  apices: Lung findings discussed on thoracic spine CT.  Spine:  Spinal canal: The spinal canal appears unremarkable.  Mineralization: Within normal limits for age.  Rotation: No significant rotation is seen.  Scoliosis: No significant scoliosis is seen.  Vertebral Fusion: No vertebral fusion is identified.  Listhesis: No significant listhesis is identified.  Lordosis: The cervical lordosis is maintained.  Intervertebral disc spaces: The intervertebral discs are preserved throughout.  Osteophytes: Pronounced multilevel endplate osteophytes are seen.  Endplate Sclerosis: No significant endplate sclerosis is seen.  Uncovertebral degenerative changes: Mild multilevel uncovertebral joint arthrosis is seen.  Facet degenerative changes: Mild multilevel facet degenerative changes are seen.  Fractures: No acute fracture dislocation or subluxation is seen.      Impression:  1. No acute fracture dislocation or subluxation is seen.  2. Degenerative changes and other details as above.                                 CT Head Without Contrast (Final result)  Result time 05/22/22 17:01:07    Final result by Daryl Sewell MD (05/22/22 17:01:07)                 Impression:      1.  Fractures with depressions involving the outer table of the left frontal sinus with left frontal sinus hyperdense hemorrhagic fluid.    2.  Minimal focal fracture inner table of the left frontal sinus without pneumocephalus.    3.  Artifact versus punctate hyperdense hemorrhage right frontal lobe.    Findings were notified to Dr. Summers May 22, 2022 at 17:00 hours.      Electronically signed by: Daryl Sewell  Date:    05/22/2022  Time:    17:01             Narrative:    EXAMINATION:  CT HEAD WITHOUT CONTRAST    CLINICAL HISTORY:  Trauma;    TECHNIQUE:  Sequential axial images were performed of the brain without contrast.    Dose product length of 2562 mGycm. Automated exposure control was utilized to minimize radiation dose.    COMPARISON:  None  available.    FINDINGS:  There are fractures with depressions which involve the outer table of the left frontal sinus and the left frontal sinus is occupied by hyperdense fluid.  There is also minimal focal fracture defect of the inner table of the left neural sinus which is seen on image 23 series 7.  There is no apparent pneumocephalus.    There is an artifact versus punctate hyperdense hemorrhage right frontal lobe on image 23 series 5.  There is no intracranial mass effect, midline shift or hydrocephalus. There is no sulcal effacement or low attenuation changes to suggest recent large vessel territory infarction. Chronic appearing periventricular and subcortical white matter low attenuation changes are present and are consistent with chronic microangiopathic ischemia. The ventricular system and sulcal markings prominence is consistent with atrophy. There is no acute extra axial fluid collection.                               X-Ray Chest 1 View (Final result)  Result time 05/22/22 16:26:35    Final result by Daryl Sewell MD (05/22/22 16:26:35)                 Impression:      As above.      Electronically signed by: Daryl Sewell  Date:    05/22/2022  Time:    16:26             Narrative:    EXAMINATION:  XR CHEST 1 VIEW    CLINICAL HISTORY:  r/o bleeding or hemorrhage;    TECHNIQUE:  One view    COMPARISON:  None available.    FINDINGS:  Cardiopericardial silhouette is within normal limits.  Lungs hyperinflation accentuates bronchovascular appearance without exclusion of mild congestive process.  There is no focally dense consolidation, fluid within the pleural space or pneumothorax.                                 ASSESSMENT:     Patient is a 61-year-old female status post fall with intracranial hemorrhage, frontal sinus fractures 2 days after having a L4-L5 laminectomy at an outside facility    PLAN:   Admit to ICU  Q.1 hour neurovascular checks  Keppra 500 b.i.d. for 7 days  Follow-up neurosurgical  recommendations  Follow-up faces consult  NPO IV maintenance fluids  Hold Lovenox for now  P.r.n. antiemetics and narcotics for pain control        Buddy Hermosillo MD   [employer] General Surgery, PGY-2

## 2022-05-23 NOTE — PT/OT/SLP PROGRESS
Physical Therapy      Patient Name:  JAG NORIEGA   MRN:  [id number]    Pt sleeping upon arrival to room. Pt's sister sitting at bedside. Reports pt has not slept in 2 days. Pt briefly woke up but quickly fell back asleep. PT to f/u tomorrow.

## 2022-05-24 ENCOUNTER — PATIENT OUTREACH (OUTPATIENT)
Dept: ADMINISTRATIVE | Facility: CLINIC | Age: 62
End: 2022-05-24
Payer: COMMERCIAL

## 2022-05-28 ENCOUNTER — OUTSIDE PLACE OF SERVICE (OUTPATIENT)
Dept: NEUROLOGY | Facility: CLINIC | Age: 62
End: 2022-05-28
Payer: COMMERCIAL

## 2022-05-28 DIAGNOSIS — G20.A1 PARKINSON DISEASE: ICD-10-CM
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
## 2022-05-28 DIAGNOSIS — R47.81 SLURRED SPEECH: ICD-10-CM

## 2022-05-28 PROCEDURE — 99223 1ST HOSP IP/OBS HIGH 75: CPT | Mod: S$GLB,,, | Performed by: SPECIALIST

## 2022-05-28 PROCEDURE — 99223 PR INITIAL HOSPITAL CARE,LEVL III: ICD-10-PCS | Mod: S$GLB,,, | Performed by: SPECIALIST

## 2022-05-29 PROCEDURE — 99233 PR SUBSEQUENT HOSPITAL CARE,LEVL III: ICD-10-PCS | Mod: S$GLB,,, | Performed by: SPECIALIST

## 2022-05-29 PROCEDURE — 99233 SBSQ HOSP IP/OBS HIGH 50: CPT | Mod: S$GLB,,, | Performed by: SPECIALIST

## 2022-05-30 PROCEDURE — 99232 SBSQ HOSP IP/OBS MODERATE 35: CPT | Mod: S$GLB,,, | Performed by: SPECIALIST

## 2022-05-30 PROCEDURE — 99232 PR SUBSEQUENT HOSPITAL CARE,LEVL II: ICD-10-PCS | Mod: S$GLB,,, | Performed by: SPECIALIST

## (undated) DEVICE — DRESSING AQUACEL AG 3.5X10IN

## (undated) DEVICE — SUT VICRYL BR 1 GEN 27 CT-1

## (undated) DEVICE — SEE MEDLINE ITEM 146271

## (undated) DEVICE — SUT MONOCRYL 3-0 PS-2 UND

## (undated) DEVICE — APPLICATOR CHLORAPREP ORN 26ML

## (undated) DEVICE — ELECTRODE REM PLYHSV RETURN 9

## (undated) DEVICE — BLADE SURG CARBON STEEL #10

## (undated) DEVICE — TOURNIQUET SB QC DP 34X4IN

## (undated) DEVICE — TRAY MINOR ORTHO

## (undated) DEVICE — PADDING CAST 6IN DELTA ROLL

## (undated) DEVICE — TUBE SUCTION YANKAUER

## (undated) DEVICE — SEE MEDLINE ITEM 146298

## (undated) DEVICE — DRAPE PLASTIC U 60X72

## (undated) DEVICE — DRAPE STERI U-SHAPED 47X51IN

## (undated) DEVICE — STOCKINET TUBULAR 1 PLY 6X60IN

## (undated) DEVICE — SEE MEDLINE ITEM 157150

## (undated) DEVICE — SUT VICRYL 3-0 27 SH